# Patient Record
Sex: FEMALE | Race: WHITE | NOT HISPANIC OR LATINO
[De-identification: names, ages, dates, MRNs, and addresses within clinical notes are randomized per-mention and may not be internally consistent; named-entity substitution may affect disease eponyms.]

---

## 2017-01-31 ENCOUNTER — RECORD ABSTRACTING (OUTPATIENT)
Age: 62
End: 2017-01-31

## 2017-01-31 DIAGNOSIS — Z85.828 PERSONAL HISTORY OF OTHER MALIGNANT NEOPLASM OF SKIN: ICD-10-CM

## 2017-01-31 DIAGNOSIS — Z78.9 OTHER SPECIFIED HEALTH STATUS: ICD-10-CM

## 2017-01-31 DIAGNOSIS — L90.5 SCAR CONDITIONS AND FIBROSIS OF SKIN: ICD-10-CM

## 2017-01-31 DIAGNOSIS — Z87.898 PERSONAL HISTORY OF OTHER SPECIFIED CONDITIONS: ICD-10-CM

## 2017-01-31 RX ORDER — PRAVASTATIN SODIUM 80 MG/1
TABLET ORAL
Refills: 0 | Status: ACTIVE | COMMUNITY

## 2017-01-31 RX ORDER — NORTRIPTYLINE HCL 25 MG
CAPSULE ORAL
Refills: 0 | Status: ACTIVE | COMMUNITY

## 2017-02-08 ENCOUNTER — EMERGENCY (EMERGENCY)
Facility: HOSPITAL | Age: 62
LOS: 0 days | Discharge: HOME | End: 2017-02-08
Attending: EMERGENCY MEDICINE | Admitting: EMERGENCY MEDICINE
Payer: MEDICARE

## 2017-02-08 VITALS
HEART RATE: 94 BPM | OXYGEN SATURATION: 100 % | DIASTOLIC BLOOD PRESSURE: 81 MMHG | TEMPERATURE: 98 F | RESPIRATION RATE: 16 BRPM | SYSTOLIC BLOOD PRESSURE: 146 MMHG

## 2017-02-08 VITALS
TEMPERATURE: 98 F | SYSTOLIC BLOOD PRESSURE: 156 MMHG | RESPIRATION RATE: 16 BRPM | OXYGEN SATURATION: 99 % | DIASTOLIC BLOOD PRESSURE: 91 MMHG | HEART RATE: 119 BPM | WEIGHT: 164.91 LBS

## 2017-02-08 DIAGNOSIS — R07.9 CHEST PAIN, UNSPECIFIED: ICD-10-CM

## 2017-02-08 LAB
ALBUMIN SERPL ELPH-MCNC: 4.2 G/DL — SIGNIFICANT CHANGE UP (ref 3.3–5)
ALP SERPL-CCNC: 85 U/L — SIGNIFICANT CHANGE UP (ref 40–120)
ALT FLD-CCNC: 26 U/L — SIGNIFICANT CHANGE UP (ref 12–78)
ANION GAP SERPL CALC-SCNC: 6 MMOL/L — SIGNIFICANT CHANGE UP (ref 5–17)
AST SERPL-CCNC: 15 U/L — SIGNIFICANT CHANGE UP (ref 15–37)
BASOPHILS # BLD AUTO: 0.1 K/UL — SIGNIFICANT CHANGE UP (ref 0–0.2)
BASOPHILS NFR BLD AUTO: 0.9 % — SIGNIFICANT CHANGE UP (ref 0–2)
BILIRUB SERPL-MCNC: 0.5 MG/DL — SIGNIFICANT CHANGE UP (ref 0.2–1.2)
BUN SERPL-MCNC: 16 MG/DL — SIGNIFICANT CHANGE UP (ref 7–23)
CALCIUM SERPL-MCNC: 9 MG/DL — SIGNIFICANT CHANGE UP (ref 8.5–10.1)
CHLORIDE SERPL-SCNC: 109 MMOL/L — HIGH (ref 96–108)
CO2 SERPL-SCNC: 28 MMOL/L — SIGNIFICANT CHANGE UP (ref 22–31)
CREAT SERPL-MCNC: 0.65 MG/DL — SIGNIFICANT CHANGE UP (ref 0.5–1.3)
D DIMER BLD IA.RAPID-MCNC: <150 NG/ML DDU — SIGNIFICANT CHANGE UP
EOSINOPHIL # BLD AUTO: 0 K/UL — SIGNIFICANT CHANGE UP (ref 0–0.5)
EOSINOPHIL NFR BLD AUTO: 0.6 % — SIGNIFICANT CHANGE UP (ref 0–6)
GLUCOSE SERPL-MCNC: 110 MG/DL — HIGH (ref 70–99)
HCT VFR BLD CALC: 42.7 % — SIGNIFICANT CHANGE UP (ref 34.5–45)
HGB BLD-MCNC: 13.5 G/DL — SIGNIFICANT CHANGE UP (ref 11.5–15.5)
LYMPHOCYTES # BLD AUTO: 1.1 K/UL — SIGNIFICANT CHANGE UP (ref 1–3.3)
LYMPHOCYTES # BLD AUTO: 19 % — SIGNIFICANT CHANGE UP (ref 13–44)
MCHC RBC-ENTMCNC: 28.9 PG — SIGNIFICANT CHANGE UP (ref 27–34)
MCHC RBC-ENTMCNC: 31.7 GM/DL — LOW (ref 32–36)
MCV RBC AUTO: 91.4 FL — SIGNIFICANT CHANGE UP (ref 80–100)
MONOCYTES # BLD AUTO: 0.4 K/UL — SIGNIFICANT CHANGE UP (ref 0–0.9)
MONOCYTES NFR BLD AUTO: 6 % — SIGNIFICANT CHANGE UP (ref 2–14)
NEUTROPHILS # BLD AUTO: 4.4 K/UL — SIGNIFICANT CHANGE UP (ref 1.8–7.4)
NEUTROPHILS NFR BLD AUTO: 73.6 % — SIGNIFICANT CHANGE UP (ref 43–77)
PLATELET # BLD AUTO: 251 K/UL — SIGNIFICANT CHANGE UP (ref 150–400)
POTASSIUM SERPL-MCNC: 4.1 MMOL/L — SIGNIFICANT CHANGE UP (ref 3.5–5.3)
POTASSIUM SERPL-SCNC: 4.1 MMOL/L — SIGNIFICANT CHANGE UP (ref 3.5–5.3)
PROT SERPL-MCNC: 7.2 GM/DL — SIGNIFICANT CHANGE UP (ref 6–8.3)
RBC # BLD: 4.68 M/UL — SIGNIFICANT CHANGE UP (ref 3.8–5.2)
RBC # FLD: 12.5 % — SIGNIFICANT CHANGE UP (ref 10.3–14.5)
SODIUM SERPL-SCNC: 143 MMOL/L — SIGNIFICANT CHANGE UP (ref 135–145)
TROPONIN I SERPL-MCNC: <0.015 NG/ML — SIGNIFICANT CHANGE UP (ref 0.01–0.04)
TROPONIN I SERPL-MCNC: <0.015 NG/ML — SIGNIFICANT CHANGE UP (ref 0.01–0.04)
WBC # BLD: 6 K/UL — SIGNIFICANT CHANGE UP (ref 3.8–10.5)
WBC # FLD AUTO: 6 K/UL — SIGNIFICANT CHANGE UP (ref 3.8–10.5)

## 2017-02-08 PROCEDURE — 93010 ELECTROCARDIOGRAM REPORT: CPT

## 2017-02-08 PROCEDURE — 99285 EMERGENCY DEPT VISIT HI MDM: CPT

## 2017-02-08 PROCEDURE — 71020: CPT | Mod: 26

## 2017-02-08 RX ORDER — SODIUM CHLORIDE 9 MG/ML
1000 INJECTION INTRAMUSCULAR; INTRAVENOUS; SUBCUTANEOUS
Qty: 0 | Refills: 0 | Status: DISCONTINUED | OUTPATIENT
Start: 2017-02-08 | End: 2017-02-08

## 2017-02-08 RX ORDER — ASPIRIN/CALCIUM CARB/MAGNESIUM 324 MG
325 TABLET ORAL ONCE
Qty: 0 | Refills: 0 | Status: COMPLETED | OUTPATIENT
Start: 2017-02-08 | End: 2017-02-08

## 2017-02-08 RX ORDER — SODIUM CHLORIDE 9 MG/ML
3 INJECTION INTRAMUSCULAR; INTRAVENOUS; SUBCUTANEOUS ONCE
Qty: 0 | Refills: 0 | Status: DISCONTINUED | OUTPATIENT
Start: 2017-02-08 | End: 2017-02-08

## 2017-02-08 RX ADMIN — Medication 325 MILLIGRAM(S): at 07:28

## 2017-02-08 NOTE — ED PROVIDER NOTE - MUSCULOSKELETAL, MLM
Spine appears normal, range of motion is not limited. Focal tenderness to costochondral margin, left side ribs #8-9.

## 2017-02-08 NOTE — ED ADULT TRIAGE NOTE - CHIEF COMPLAINT QUOTE
Chest tightness intermittently xseveral days. Saw PMD yesterday for possible shingles to left side and was told it wasn't shingles and was given naproxen.

## 2017-02-08 NOTE — ED PROVIDER NOTE - OBJECTIVE STATEMENT
61f PMH HTN, GERD, presents for intermittent chest discomfort. Pt had some pain relief when she undid her bra. Pain is positional and when her hands are over her head, it doesn't hurt as much. Radiates to upper back. She went to her PMD for an unrelated burning sensation in the left side yesterday. Pain does not feel like reflux. Denies cardiac FHx.

## 2017-03-08 ENCOUNTER — APPOINTMENT (OUTPATIENT)
Dept: DERMATOLOGY | Facility: CLINIC | Age: 62
End: 2017-03-08

## 2017-04-24 ENCOUNTER — APPOINTMENT (OUTPATIENT)
Dept: DERMATOLOGY | Facility: CLINIC | Age: 62
End: 2017-04-24

## 2017-04-24 VITALS — WEIGHT: 160 LBS | BODY MASS INDEX: 25.11 KG/M2 | HEIGHT: 67 IN

## 2017-10-23 ENCOUNTER — APPOINTMENT (OUTPATIENT)
Dept: DERMATOLOGY | Facility: CLINIC | Age: 62
End: 2017-10-23
Payer: MEDICARE

## 2017-10-23 DIAGNOSIS — D23.9 OTHER BENIGN NEOPLASM OF SKIN, UNSPECIFIED: ICD-10-CM

## 2017-10-23 DIAGNOSIS — L82.0 INFLAMED SEBORRHEIC KERATOSIS: ICD-10-CM

## 2017-10-23 PROCEDURE — 17000 DESTRUCT PREMALG LESION: CPT | Mod: 59

## 2017-10-23 PROCEDURE — 17110 DESTRUCTION B9 LES UP TO 14: CPT

## 2017-10-23 PROCEDURE — 99213 OFFICE O/P EST LOW 20 MIN: CPT | Mod: 25

## 2017-10-23 PROCEDURE — 17003 DESTRUCT PREMALG LES 2-14: CPT

## 2017-10-23 PROCEDURE — 0039D: CPT

## 2017-10-23 RX ORDER — RANITIDINE HYDROCHLORIDE 300 MG/1
TABLET, FILM COATED ORAL
Refills: 0 | Status: DISCONTINUED | COMMUNITY
End: 2017-10-23

## 2017-10-23 RX ORDER — CLONAZEPAM 0.5 MG/1
0.5 TABLET ORAL
Qty: 60 | Refills: 0 | Status: ACTIVE | COMMUNITY
Start: 2017-08-24

## 2017-10-23 RX ORDER — PREDNISONE 10 MG/1
10 TABLET ORAL
Refills: 0 | Status: DISCONTINUED | COMMUNITY
End: 2017-10-23

## 2017-10-23 RX ORDER — OMEPRAZOLE 20 MG/1
TABLET, DELAYED RELEASE ORAL
Refills: 0 | Status: DISCONTINUED | COMMUNITY
End: 2017-10-23

## 2018-07-28 PROBLEM — Z85.828 HISTORY OF BASAL CELL CARCINOMA: Status: RESOLVED | Noted: 2017-01-31 | Resolved: 2018-07-28

## 2018-09-02 ENCOUNTER — EMERGENCY (EMERGENCY)
Facility: HOSPITAL | Age: 63
LOS: 0 days | Discharge: HOME | End: 2018-09-02
Attending: EMERGENCY MEDICINE | Admitting: EMERGENCY MEDICINE
Payer: MEDICARE

## 2018-09-02 VITALS
HEART RATE: 113 BPM | RESPIRATION RATE: 17 BRPM | WEIGHT: 154.98 LBS | HEIGHT: 65 IN | DIASTOLIC BLOOD PRESSURE: 77 MMHG | SYSTOLIC BLOOD PRESSURE: 145 MMHG | TEMPERATURE: 98 F | OXYGEN SATURATION: 98 %

## 2018-09-02 VITALS
DIASTOLIC BLOOD PRESSURE: 83 MMHG | TEMPERATURE: 98 F | HEART RATE: 90 BPM | SYSTOLIC BLOOD PRESSURE: 144 MMHG | OXYGEN SATURATION: 100 % | RESPIRATION RATE: 17 BRPM

## 2018-09-02 DIAGNOSIS — I10 ESSENTIAL (PRIMARY) HYPERTENSION: ICD-10-CM

## 2018-09-02 DIAGNOSIS — S80.01XA CONTUSION OF RIGHT KNEE, INITIAL ENCOUNTER: ICD-10-CM
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
## 2018-09-02 DIAGNOSIS — S50.01XA CONTUSION OF RIGHT ELBOW, INITIAL ENCOUNTER: ICD-10-CM

## 2018-09-02 PROCEDURE — 73562 X-RAY EXAM OF KNEE 3: CPT | Mod: 26,RT

## 2018-09-02 PROCEDURE — 73080 X-RAY EXAM OF ELBOW: CPT | Mod: 26,RT

## 2018-09-02 PROCEDURE — 73502 X-RAY EXAM HIP UNI 2-3 VIEWS: CPT | Mod: 26,RT

## 2018-09-02 PROCEDURE — 99284 EMERGENCY DEPT VISIT MOD MDM: CPT

## 2018-09-02 PROCEDURE — 73060 X-RAY EXAM OF HUMERUS: CPT | Mod: 26,RT

## 2018-09-02 PROCEDURE — 71046 X-RAY EXAM CHEST 2 VIEWS: CPT | Mod: 26

## 2018-09-02 PROCEDURE — 73030 X-RAY EXAM OF SHOULDER: CPT | Mod: 26,RT

## 2018-09-02 NOTE — ED ADULT NURSE NOTE - NSIMPLEMENTINTERV_GEN_ALL_ED
Implemented All Fall Risk Interventions:  Ball to call system. Call bell, personal items and telephone within reach. Instruct patient to call for assistance. Room bathroom lighting operational. Non-slip footwear when patient is off stretcher. Physically safe environment: no spills, clutter or unnecessary equipment. Stretcher in lowest position, wheels locked, appropriate side rails in place. Provide visual cue, wrist band, yellow gown, etc. Monitor gait and stability. Monitor for mental status changes and reorient to person, place, and time. Review medications for side effects contributing to fall risk. Reinforce activity limits and safety measures with patient and family.

## 2018-09-02 NOTE — ED PROVIDER NOTE - PHYSICAL EXAMINATION
***GEN - NAD; well appearing; A+O x3 ***HEAD - NC/AT   ***PULMONARY - CTA b/l, symmetric breath sounds. ***CARDIAC -s1s2, RRR, no M,G,R  ***ABDOMEN - ND, NT, soft, no guarding, no rebound, no masses    ***SKIN - no rash or bruising   ***NEUROLOGIC - alert and oriented, follows commands, sensation nl, motor nl, ***PSYCH - insight and judgment nl, memory nl, affect nl, thought nl   Bruising to R knee, R elbow, and distal humerus  FROM to all joints, minimally tender  neurovascular intact  No midline TTP

## 2018-09-02 NOTE — ED PROVIDER NOTE - OBJECTIVE STATEMENT
63 y/o F w/ hx depression, HTN pf fall.  pt tripped over dog gate @ 3AM landing on R knee/arm.  Notes bruising and pain improved w/ advil this AM.  Denies CP, SOB, AP, n/v, numbness/weakness.  No AC use.

## 2018-09-02 NOTE — ED ADULT TRIAGE NOTE - CHIEF COMPLAINT QUOTE
tripped over baby gate and has bruising on her right arm, hip and knee. no head trauma no anticoagulation

## 2018-11-08 NOTE — ED ADULT NURSE NOTE - NSSISCREENINGQ5_ED_A_ED
LMOM for pt to C/B, C/B # provided  --when pt calls need to tell her, her last dose of ASA is on 11/29/18 and to start holding it on 11/30/18 for her procedure scheduled on 12/6  No

## 2018-11-26 ENCOUNTER — APPOINTMENT (OUTPATIENT)
Dept: DERMATOLOGY | Facility: CLINIC | Age: 63
End: 2018-11-26
Payer: MEDICARE

## 2018-11-26 VITALS — BODY MASS INDEX: 26.16 KG/M2 | HEIGHT: 65 IN | WEIGHT: 157 LBS

## 2018-11-26 PROBLEM — F32.9 MAJOR DEPRESSIVE DISORDER, SINGLE EPISODE, UNSPECIFIED: Chronic | Status: ACTIVE | Noted: 2018-09-02

## 2018-11-26 PROCEDURE — 99213 OFFICE O/P EST LOW 20 MIN: CPT

## 2018-11-26 RX ORDER — HYDROCORTISONE 25 MG/ML
2.5 LOTION TOPICAL TWICE DAILY
Qty: 1 | Refills: 1 | Status: DISCONTINUED | COMMUNITY
Start: 2017-10-23 | End: 2018-11-26

## 2018-11-26 RX ORDER — CETIRIZINE HYDROCHLORIDE 10 MG/1
10 TABLET, FILM COATED ORAL
Refills: 0 | Status: DISCONTINUED | COMMUNITY
End: 2018-11-26

## 2019-06-29 ENCOUNTER — TRANSCRIPTION ENCOUNTER (OUTPATIENT)
Age: 64
End: 2019-06-29

## 2019-12-02 ENCOUNTER — APPOINTMENT (OUTPATIENT)
Dept: DERMATOLOGY | Facility: CLINIC | Age: 64
End: 2019-12-02
Payer: MEDICARE

## 2019-12-02 VITALS — HEIGHT: 65 IN | WEIGHT: 157 LBS | BODY MASS INDEX: 26.16 KG/M2

## 2019-12-02 DIAGNOSIS — L82.1 OTHER SEBORRHEIC KERATOSIS: ICD-10-CM

## 2019-12-02 DIAGNOSIS — H91.93 UNSPECIFIED HEARING LOSS, BILATERAL: ICD-10-CM

## 2019-12-02 DIAGNOSIS — Z00.00 ENCOUNTER FOR GENERAL ADULT MEDICAL EXAMINATION W/OUT ABNORMAL FINDINGS: ICD-10-CM

## 2019-12-02 PROCEDURE — 99213 OFFICE O/P EST LOW 20 MIN: CPT

## 2019-12-02 NOTE — HISTORY OF PRESENT ILLNESS
[FreeTextEntry1] : Patient presents for skin examination. [de-identified] : Denies new, changing, bleeding or tender lesions on the skin over the past year.\par

## 2019-12-02 NOTE — PHYSICAL EXAM
[Alert] : alert [Oriented x 3] : ~L oriented x 3 [Well Nourished] : well nourished [Full Body Skin Exam Performed] : performed [FreeTextEntry3] : A full skin exam was performed including the scalp, face, neck, chest, abdomen, back, buttocks, upper extremities and lower extremities.  The patient declined examination of the breasts and genitalia.  \par The exam did not reveal any evidence of skin cancer, showing only the following benign growths:\par [place] pigmented nevi.\par Seborrheic keratoses - right abdomen.\par Lentigines.\par Cherry angioma.\par \par

## 2019-12-02 NOTE — ASSESSMENT
[FreeTextEntry1] : A complete skin examination was performed.  There is no evidence of skin cancer.  We discussed the importance of photoprotection, including the use of hats, protective clothing and sunscreens with an SPF of at least 30.  Sun avoidance was also discussed.\par \par Patient to see Dr. Montejo to consider if she is a candidate for Ulthera.

## 2020-01-14 ENCOUNTER — APPOINTMENT (OUTPATIENT)
Dept: DERMATOLOGY | Facility: CLINIC | Age: 65
End: 2020-01-14
Payer: MEDICARE

## 2020-01-14 DIAGNOSIS — L57.8 OTHER SKIN CHANGES DUE TO CHRONIC EXPOSURE TO NONIONIZING RADIATION: ICD-10-CM

## 2020-01-14 DIAGNOSIS — E88.1 LIPODYSTROPHY, NOT ELSEWHERE CLASSIFIED: ICD-10-CM

## 2020-01-14 PROCEDURE — 99213 OFFICE O/P EST LOW 20 MIN: CPT

## 2020-12-03 ENCOUNTER — APPOINTMENT (OUTPATIENT)
Dept: DERMATOLOGY | Facility: CLINIC | Age: 65
End: 2020-12-03
Payer: MEDICARE

## 2020-12-03 VITALS — WEIGHT: 155 LBS | BODY MASS INDEX: 25.83 KG/M2 | HEIGHT: 65 IN

## 2020-12-03 DIAGNOSIS — L57.0 ACTINIC KERATOSIS: ICD-10-CM

## 2020-12-03 DIAGNOSIS — D22.9 MELANOCYTIC NEVI, UNSPECIFIED: ICD-10-CM

## 2020-12-03 PROCEDURE — 17000 DESTRUCT PREMALG LESION: CPT

## 2020-12-03 PROCEDURE — 99213 OFFICE O/P EST LOW 20 MIN: CPT | Mod: 25

## 2020-12-03 RX ORDER — HYDROXYZINE HYDROCHLORIDE 25 MG/1
25 TABLET ORAL
Refills: 0 | Status: DISCONTINUED | COMMUNITY
End: 2020-12-03

## 2020-12-03 RX ORDER — CHOLECALCIFEROL (VITAMIN D3) 25 MCG
TABLET ORAL
Refills: 0 | Status: ACTIVE | COMMUNITY

## 2020-12-03 NOTE — HISTORY OF PRESENT ILLNESS
[FreeTextEntry1] : Patient presents for skin examination. [de-identified] : Notes lesion of the nose.  Present for months.  No bleeding or tenderness.  No self tx.

## 2020-12-03 NOTE — PHYSICAL EXAM
[Alert] : alert [Oriented x 3] : ~L oriented x 3 [Well Nourished] : well nourished [Full Body Skin Exam Performed] : performed [FreeTextEntry3] : A full skin exam was performed including the scalp, face, neck, chest, abdomen, back, buttocks, upper extremities and lower extremities.  The patient declined examination of the breasts and genitalia.  \par The exam did show the following benign growths:\par [place] pigmented nevi.\par Lentigines.\par Cherry angioma.\par \par Keratotic erythematous papule of the right nasal tip.\par \par

## 2021-03-31 ENCOUNTER — TRANSCRIPTION ENCOUNTER (OUTPATIENT)
Age: 66
End: 2021-03-31

## 2021-03-31 ENCOUNTER — INPATIENT (INPATIENT)
Facility: HOSPITAL | Age: 66
LOS: 0 days | Discharge: HOME | DRG: 69 | End: 2021-04-01
Attending: HOSPITALIST | Admitting: INTERNAL MEDICINE
Payer: MEDICARE

## 2021-03-31 VITALS — WEIGHT: 161.16 LBS | HEIGHT: 65 IN

## 2021-03-31 DIAGNOSIS — G45.9 TRANSIENT CEREBRAL ISCHEMIC ATTACK, UNSPECIFIED: ICD-10-CM

## 2021-03-31 LAB
ALBUMIN SERPL ELPH-MCNC: 4.3 G/DL — SIGNIFICANT CHANGE UP (ref 3.3–5)
ALP SERPL-CCNC: 84 U/L — SIGNIFICANT CHANGE UP (ref 40–120)
ALT FLD-CCNC: 19 U/L — SIGNIFICANT CHANGE UP (ref 12–78)
ANION GAP SERPL CALC-SCNC: 5 MMOL/L — SIGNIFICANT CHANGE UP (ref 5–17)
APPEARANCE UR: CLEAR — SIGNIFICANT CHANGE UP
APTT BLD: 32.7 SEC — SIGNIFICANT CHANGE UP (ref 27.5–35.5)
AST SERPL-CCNC: 13 U/L — LOW (ref 15–37)
BASOPHILS # BLD AUTO: 0.05 K/UL — SIGNIFICANT CHANGE UP (ref 0–0.2)
BASOPHILS NFR BLD AUTO: 0.8 % — SIGNIFICANT CHANGE UP (ref 0–2)
BILIRUB SERPL-MCNC: 0.8 MG/DL — SIGNIFICANT CHANGE UP (ref 0.2–1.2)
BILIRUB UR-MCNC: NEGATIVE — SIGNIFICANT CHANGE UP
BUN SERPL-MCNC: 15 MG/DL — SIGNIFICANT CHANGE UP (ref 7–23)
CALCIUM SERPL-MCNC: 9.5 MG/DL — SIGNIFICANT CHANGE UP (ref 8.5–10.1)
CHLORIDE SERPL-SCNC: 106 MMOL/L — SIGNIFICANT CHANGE UP (ref 96–108)
CO2 SERPL-SCNC: 28 MMOL/L — SIGNIFICANT CHANGE UP (ref 22–31)
COLOR SPEC: YELLOW — SIGNIFICANT CHANGE UP
CREAT SERPL-MCNC: 0.84 MG/DL — SIGNIFICANT CHANGE UP (ref 0.5–1.3)
DIFF PNL FLD: NEGATIVE — SIGNIFICANT CHANGE UP
EOSINOPHIL # BLD AUTO: 0.01 K/UL — SIGNIFICANT CHANGE UP (ref 0–0.5)
EOSINOPHIL NFR BLD AUTO: 0.2 % — SIGNIFICANT CHANGE UP (ref 0–6)
GLUCOSE SERPL-MCNC: 104 MG/DL — HIGH (ref 70–99)
GLUCOSE UR QL: NEGATIVE MG/DL — SIGNIFICANT CHANGE UP
HCT VFR BLD CALC: 43.4 % — SIGNIFICANT CHANGE UP (ref 34.5–45)
HGB BLD-MCNC: 14.1 G/DL — SIGNIFICANT CHANGE UP (ref 11.5–15.5)
IMM GRANULOCYTES NFR BLD AUTO: 0.3 % — SIGNIFICANT CHANGE UP (ref 0–1.5)
INR BLD: 1.11 RATIO — SIGNIFICANT CHANGE UP (ref 0.88–1.16)
KETONES UR-MCNC: NEGATIVE — SIGNIFICANT CHANGE UP
LEUKOCYTE ESTERASE UR-ACNC: NEGATIVE — SIGNIFICANT CHANGE UP
LYMPHOCYTES # BLD AUTO: 1.45 K/UL — SIGNIFICANT CHANGE UP (ref 1–3.3)
LYMPHOCYTES # BLD AUTO: 22.9 % — SIGNIFICANT CHANGE UP (ref 13–44)
MCHC RBC-ENTMCNC: 29.9 PG — SIGNIFICANT CHANGE UP (ref 27–34)
MCHC RBC-ENTMCNC: 32.5 GM/DL — SIGNIFICANT CHANGE UP (ref 32–36)
MCV RBC AUTO: 91.9 FL — SIGNIFICANT CHANGE UP (ref 80–100)
MONOCYTES # BLD AUTO: 0.57 K/UL — SIGNIFICANT CHANGE UP (ref 0–0.9)
MONOCYTES NFR BLD AUTO: 9 % — SIGNIFICANT CHANGE UP (ref 2–14)
NEUTROPHILS # BLD AUTO: 4.22 K/UL — SIGNIFICANT CHANGE UP (ref 1.8–7.4)
NEUTROPHILS NFR BLD AUTO: 66.8 % — SIGNIFICANT CHANGE UP (ref 43–77)
NITRITE UR-MCNC: NEGATIVE — SIGNIFICANT CHANGE UP
PH UR: 8 — SIGNIFICANT CHANGE UP (ref 5–8)
PLATELET # BLD AUTO: 275 K/UL — SIGNIFICANT CHANGE UP (ref 150–400)
POTASSIUM SERPL-MCNC: 3.8 MMOL/L — SIGNIFICANT CHANGE UP (ref 3.5–5.3)
POTASSIUM SERPL-SCNC: 3.8 MMOL/L — SIGNIFICANT CHANGE UP (ref 3.5–5.3)
PROT SERPL-MCNC: 7.9 GM/DL — SIGNIFICANT CHANGE UP (ref 6–8.3)
PROT UR-MCNC: NEGATIVE MG/DL — SIGNIFICANT CHANGE UP
PROTHROM AB SERPL-ACNC: 12.9 SEC — SIGNIFICANT CHANGE UP (ref 10.6–13.6)
RBC # BLD: 4.72 M/UL — SIGNIFICANT CHANGE UP (ref 3.8–5.2)
RBC # FLD: 13.4 % — SIGNIFICANT CHANGE UP (ref 10.3–14.5)
SODIUM SERPL-SCNC: 139 MMOL/L — SIGNIFICANT CHANGE UP (ref 135–145)
SP GR SPEC: 1.02 — SIGNIFICANT CHANGE UP (ref 1.01–1.02)
TROPONIN I SERPL-MCNC: <0.015 NG/ML — SIGNIFICANT CHANGE UP (ref 0.01–0.04)
UROBILINOGEN FLD QL: NEGATIVE MG/DL — SIGNIFICANT CHANGE UP
WBC # BLD: 6.32 K/UL — SIGNIFICANT CHANGE UP (ref 3.8–10.5)
WBC # FLD AUTO: 6.32 K/UL — SIGNIFICANT CHANGE UP (ref 3.8–10.5)

## 2021-03-31 PROCEDURE — 99285 EMERGENCY DEPT VISIT HI MDM: CPT | Mod: CS

## 2021-03-31 PROCEDURE — 83036 HEMOGLOBIN GLYCOSYLATED A1C: CPT

## 2021-03-31 PROCEDURE — 70551 MRI BRAIN STEM W/O DYE: CPT

## 2021-03-31 PROCEDURE — 86769 SARS-COV-2 COVID-19 ANTIBODY: CPT

## 2021-03-31 PROCEDURE — 70496 CT ANGIOGRAPHY HEAD: CPT | Mod: 26

## 2021-03-31 PROCEDURE — 99222 1ST HOSP IP/OBS MODERATE 55: CPT | Mod: AI

## 2021-03-31 PROCEDURE — 84443 ASSAY THYROID STIM HORMONE: CPT

## 2021-03-31 PROCEDURE — 86780 TREPONEMA PALLIDUM: CPT

## 2021-03-31 PROCEDURE — 70498 CT ANGIOGRAPHY NECK: CPT | Mod: 26

## 2021-03-31 PROCEDURE — 0042T: CPT

## 2021-03-31 PROCEDURE — 93010 ELECTROCARDIOGRAM REPORT: CPT

## 2021-03-31 PROCEDURE — 80061 LIPID PANEL: CPT

## 2021-03-31 PROCEDURE — 71045 X-RAY EXAM CHEST 1 VIEW: CPT | Mod: 26

## 2021-03-31 PROCEDURE — 86803 HEPATITIS C AB TEST: CPT

## 2021-03-31 PROCEDURE — 70450 CT HEAD/BRAIN W/O DYE: CPT | Mod: 26,59

## 2021-03-31 PROCEDURE — 36415 COLL VENOUS BLD VENIPUNCTURE: CPT

## 2021-03-31 PROCEDURE — 93306 TTE W/DOPPLER COMPLETE: CPT

## 2021-03-31 RX ORDER — ACETAMINOPHEN 500 MG
650 TABLET ORAL EVERY 6 HOURS
Refills: 0 | Status: DISCONTINUED | OUTPATIENT
Start: 2021-03-31 | End: 2021-04-01

## 2021-03-31 RX ORDER — ATORVASTATIN CALCIUM 80 MG/1
10 TABLET, FILM COATED ORAL AT BEDTIME
Refills: 0 | Status: DISCONTINUED | OUTPATIENT
Start: 2021-03-31 | End: 2021-04-01

## 2021-03-31 RX ORDER — ONDANSETRON 8 MG/1
4 TABLET, FILM COATED ORAL EVERY 6 HOURS
Refills: 0 | Status: DISCONTINUED | OUTPATIENT
Start: 2021-03-31 | End: 2021-04-01

## 2021-03-31 RX ORDER — CLONAZEPAM 1 MG
1 TABLET ORAL
Qty: 0 | Refills: 0 | DISCHARGE

## 2021-03-31 RX ORDER — ASPIRIN/CALCIUM CARB/MAGNESIUM 324 MG
325 TABLET ORAL ONCE
Refills: 0 | Status: COMPLETED | OUTPATIENT
Start: 2021-03-31 | End: 2021-03-31

## 2021-03-31 RX ORDER — ASPIRIN/CALCIUM CARB/MAGNESIUM 324 MG
81 TABLET ORAL DAILY
Refills: 0 | Status: DISCONTINUED | OUTPATIENT
Start: 2021-03-31 | End: 2021-04-01

## 2021-03-31 RX ORDER — NORTRIPTYLINE HYDROCHLORIDE 10 MG/1
1 CAPSULE ORAL
Qty: 0 | Refills: 0 | DISCHARGE

## 2021-03-31 RX ORDER — PREGABALIN 225 MG/1
1 CAPSULE ORAL
Qty: 0 | Refills: 0 | DISCHARGE

## 2021-03-31 RX ORDER — NORTRIPTYLINE HYDROCHLORIDE 10 MG/1
25 CAPSULE ORAL AT BEDTIME
Refills: 0 | Status: DISCONTINUED | OUTPATIENT
Start: 2021-03-31 | End: 2021-04-01

## 2021-03-31 RX ORDER — OMEPRAZOLE 10 MG/1
1 CAPSULE, DELAYED RELEASE ORAL
Qty: 0 | Refills: 0 | DISCHARGE

## 2021-03-31 RX ORDER — CHOLECALCIFEROL (VITAMIN D3) 125 MCG
1 CAPSULE ORAL
Qty: 0 | Refills: 0 | DISCHARGE

## 2021-03-31 RX ORDER — CLONAZEPAM 1 MG
0 TABLET ORAL
Qty: 0 | Refills: 0 | DISCHARGE

## 2021-03-31 RX ORDER — CHOLECALCIFEROL (VITAMIN D3) 125 MCG
0 CAPSULE ORAL
Qty: 0 | Refills: 0 | DISCHARGE

## 2021-03-31 RX ORDER — ENOXAPARIN SODIUM 100 MG/ML
40 INJECTION SUBCUTANEOUS DAILY
Refills: 0 | Status: DISCONTINUED | OUTPATIENT
Start: 2021-03-31 | End: 2021-04-01

## 2021-03-31 RX ORDER — CLONAZEPAM 1 MG
0.5 TABLET ORAL
Refills: 0 | Status: DISCONTINUED | OUTPATIENT
Start: 2021-03-31 | End: 2021-04-01

## 2021-03-31 RX ADMIN — Medication 325 MILLIGRAM(S): at 13:07

## 2021-03-31 RX ADMIN — ATORVASTATIN CALCIUM 10 MILLIGRAM(S): 80 TABLET, FILM COATED ORAL at 21:36

## 2021-03-31 RX ADMIN — Medication 0.5 MILLIGRAM(S): at 17:02

## 2021-03-31 RX ADMIN — NORTRIPTYLINE HYDROCHLORIDE 25 MILLIGRAM(S): 10 CAPSULE ORAL at 21:36

## 2021-03-31 NOTE — H&P ADULT - NSHPPHYSICALEXAM_GEN_ALL_CORE
PHYSICAL EXAM:    Daily Height in cm: 165.1 (31 Mar 2021 11:36)    Daily     ICU Vital Signs Last 24 Hrs  T(C): 36.8 (31 Mar 2021 11:59), Max: 36.8 (31 Mar 2021 11:59)  T(F): 98.3 (31 Mar 2021 11:59), Max: 98.3 (31 Mar 2021 11:59)  HR: 106 (31 Mar 2021 11:59) (106 - 106)  BP: 136/82 (31 Mar 2021 11:59) (136/82 - 136/82)  BP(mean): --  ABP: --  ABP(mean): --  RR: 15 (31 Mar 2021 11:59) (15 - 15)  SpO2: 100% (31 Mar 2021 11:59) (100% - 100%)      Constitutional: Well appearing  HEENT: Atraumatic, MARA, Normal, No congestion  Respiratory: Breath Sounds normal, no rhonchi/wheeze  Cardiovascular: N S1S2;   Gastrointestinal: Abdomen soft, non tender, Bowel Sounds present  Extremities: No edema, peripheral pulses present  Neurological: AAO x 3, no gross focal motor deficits  Skin: Non cellulitic, no rash, ulcers  Lymph Nodes: No lymphadenopathy noted  Back: No CVA tenderness   Musculoskeletal: non tender  Breasts: Deferred  Genitourinary: deferred  Rectal: Deferred

## 2021-03-31 NOTE — H&P ADULT - NSICDXFAMILYHX_GEN_ALL_CORE_FT
FAMILY HISTORY:  Father  Still living? No  Family history of CVA, Age at diagnosis: Age Unknown    Mother  Still living? No  FH: HTN (hypertension), Age at diagnosis: Age Unknown

## 2021-03-31 NOTE — ED PROVIDER NOTE - PROGRESS NOTE DETAILS
austyn: Discussed need to admit with patient & discussed risk and benefits.  Patient agreed to admission.  Discussed case w/ admitting doctor - agreed to admit to their service. will place bridge orders. Accepting physician said:   DO NOT MOVE prior to inpatient team evaluation

## 2021-03-31 NOTE — ED PROVIDER NOTE - OBJECTIVE STATEMENT
Pertinent HPI/PMH/PSH/FHx/SHx and Review of Systems contained within  HPI: 64 y/o Patient p/w CC intermittent difficulty speaking x 7am, new onset. States 2 days ago she couldn't remember names of people at a party. Then this morning states she had difficulty finding her words and slurred speech  PMH/PSH relevant for: Depression, HTN  ROS negative for: fever, Chest pain, SOB, Nausea, vomiting, diarrhea, abdominal pain, dysuria    FamilyHx and SocialHx not otherwise contributory Pertinent HPI/PMH/PSH/FHx/SHx and Review of Systems contained within  HPI: 64 y/o Patient p/w CC intermittent difficulty speaking x 7am, new onset. States 2 days ago she couldn't remember names of people at a party. Then this morning states she had difficulty finding her words and slurred speech which has been intermittent. No hx of similar sx before.   PMH/PSH relevant for: Depression, HTN  ROS negative for: fever, Chest pain, SOB, Nausea, vomiting, diarrhea, abdominal pain, dysuria    FamilyHx and SocialHx not otherwise contributory

## 2021-03-31 NOTE — ED PROVIDER NOTE - PHYSICAL EXAMINATION
Discussed with endo- decrease medrol dose in the morning.  Watch sugars for few mre hrs and decide on disposition
*GEN: Pt is tearful on exam, well appearing   *HEAD: Normocephalic, Atraumatic  *EYES/NOSE: b/l Pupils symmetric & Reactive to ligth, EOMI b/l  *THROAT: airway patent, moist mucous membranes  *NECK: Neck supple  *PULMONARY: No Respiratory distress, symmetric b/l chest rise  *CARDIAC: s1s2, regular rhythm   *ABDOMEN:  Non Tender, Non Distended, soft, no guarding, no rebound, no masses   *BACK: no CVA tenderness, No midline vertebral tenderness to palpation   *EXTREMITIES: symmetric pulses, 2+ DP & radial pulses, no cyanosis, no edema   *SKIN: no rash, no bruising   *NEUROLOGIC: alert, CN 2-12 intact, normal finger to nose & heel to shin; no dysdiadochokinesis; equal & normal strength & sensation in b/l UE/LE; full active & passive ROM in all extremities,  no pronator drift, normal patellar reflex, normal gait, romberg sign negative, NIH- 0  *PSYCH: appropriate concern about symptoms, pleasant

## 2021-03-31 NOTE — H&P ADULT - ASSESSMENT
65/F with PMHx of HTN, depression, brought to ED today for c/o slurred speech with difficulty speaking which started today morning. She had difficulty finding her words and slurred speech which has been intermittent. No hx of similar sx before. Denies any known Hx of CVA, TIA, CAD, PVD. She also states that 2 days ago she couldn't remember the names of people at a party. Speech is better at this time. But not at baseline.     CT head, CTA head/neck neg for acute CVA.     Pt admitted with     1) TIA r/o CVA:  observe on tele  start as 81 mg daily  check lipid profile  check TSH, RPR  neuro checks  CT head, CTA head and neck neg  pt is severely claustrophobic; would need sedation if MRI is required by neuro  neuro consult  cardio consult  echo    2) Depression/anxiety: cont home meds    3) DVT PPX: lovenox    poc discussed with pt, team.

## 2021-03-31 NOTE — H&P ADULT - HISTORY OF PRESENT ILLNESS
65/F with PMHx of HTN, depression, brought to ED today for c/o slurred speech with difficulty speaking which started today morning. She had difficulty finding her words and slurred speech which has been intermittent. No hx of similar sx before. Denies any known Hx of CVA, TIA, CAD, PVD. She also states that 2 days ago she couldn't remember the names of people at a party. Speech is better at this time. But not at baseline.     CT head, CTA head/neck neg for acute CVA.

## 2021-03-31 NOTE — ED PROVIDER NOTE - CLINICAL SUMMARY MEDICAL DECISION MAKING FREE TEXT BOX
word finding difficulty since 7am, NIH 0, not a tpa candidate, will CT, dispo per neurology word finding difficulty since 7am, NIH 0, not a tpa candidate, will CT, concerns for tia, will admit

## 2021-03-31 NOTE — ED ADULT TRIAGE NOTE - CHIEF COMPLAINT QUOTE
Pt reports that she started to have slurred speech and difficulty finding words this AM.  Pt reports that she did have symptoms of difficulty recognizing close associates two days ago, but reports returning to normal yesterday.  Dr. Vallejo to pivot and called code stroke.

## 2021-03-31 NOTE — ED PROVIDER NOTE - NS ED ROS FT
Review of Systems:  	•	CONSTITUTIONAL: no fever  	•	SKIN: no rash  	•	RESPIRATORY: no shortness of breath  	•	CARDIAC: no chest pain  	•	GI:  no abd pain, no nausea, no vomiting, no diarrhea  	•	GENITO-URINARY:  no dysuria  	•	MUSCULOSKELETAL:  no back pain  	•	NEUROLOGIC: no weakness, +difficulty speaking   	•	ALLERGY: no rhinorrhea  	•	PSYSCHIATRIC: appropriate concern about symptoms

## 2021-03-31 NOTE — H&P ADULT - NSHPLABSRESULTS_GEN_ALL_CORE
Lab Results:  CBC  CBC Full  -  ( 31 Mar 2021 11:45 )  WBC Count : 6.32 K/uL  RBC Count : 4.72 M/uL  Hemoglobin : 14.1 g/dL  Hematocrit : 43.4 %  Platelet Count - Automated : 275 K/uL  Mean Cell Volume : 91.9 fl  Mean Cell Hemoglobin : 29.9 pg  Mean Cell Hemoglobin Concentration : 32.5 gm/dL  Auto Neutrophil # : 4.22 K/uL  Auto Lymphocyte # : 1.45 K/uL  Auto Monocyte # : 0.57 K/uL  Auto Eosinophil # : 0.01 K/uL  Auto Basophil # : 0.05 K/uL  Auto Neutrophil % : 66.8 %  Auto Lymphocyte % : 22.9 %  Auto Monocyte % : 9.0 %  Auto Eosinophil % : 0.2 %  Auto Basophil % : 0.8 %    .		Differential:	[] Automated		[] Manual  Chemistry                        14.1   6.32  )-----------( 275      ( 31 Mar 2021 11:45 )             43.4     03-31    139  |  106  |  15  ----------------------------<  104<H>  3.8   |  28  |  0.84    Ca    9.5      31 Mar 2021 11:45    TPro  7.9  /  Alb  4.3  /  TBili  0.8  /  DBili  x   /  AST  13<L>  /  ALT  19  /  AlkPhos  84  0331    LIVER FUNCTIONS - ( 31 Mar 2021 11:45 )  Alb: 4.3 g/dL / Pro: 7.9 gm/dL / ALK PHOS: 84 U/L / ALT: 19 U/L / AST: 13 U/L / GGT: x           PT/INR - ( 31 Mar 2021 11:45 )   PT: 12.9 sec;   INR: 1.11 ratio         PTT - ( 31 Mar 2021 11:45 )  PTT:32.7 sec  Urinalysis Basic - ( 31 Mar 2021 13:10 )    Color: Yellow / Appearance: Clear / S.020 / pH: x  Gluc: x / Ketone: Negative  / Bili: Negative / Urobili: Negative mg/dL   Blood: x / Protein: Negative mg/dL / Nitrite: Negative   Leuk Esterase: Negative / RBC: x / WBC x   Sq Epi: x / Non Sq Epi: x / Bacteria: x      RADIOLOGY RESULTS:    r< from: Xray Chest 1 View- PORTABLE-Urgent (Xray Chest 1 View- PORTABLE-Urgent .) (21 @ 12:17) >    IMPRESSION: No acute finding or change.    < end of copied text >    < from: CT Angio Neck w/ IV Cont (21 @ 11:52) >    IMPRESSION:      CT PERFUSION demonstrated: No asymmetric or territorial perfusion abnormality.    If symptoms persist consider follow up head CT or MRI, MRA  if no contraindication.    CTA COW:  Patent intracranial circulation without flow limiting stenosis    CTA NECK: Patent, ECAs, ICAs, no  hemodynamically significant stenosis at  ICA origins by NASCET criteria.  Bilateral vertebral arteries are patent without flow limiting stenosis.      < end of copied text >    MEDICATIONS  (STANDING):  aspirin  chewable 81 milliGRAM(s) Oral daily  atorvastatin 10 milliGRAM(s) Oral at bedtime  enoxaparin Injectable 40 milliGRAM(s) SubCutaneous daily  nortriptyline 25 milliGRAM(s) Oral at bedtime    MEDICATIONS  (PRN):  acetaminophen   Tablet .. 650 milliGRAM(s) Oral every 6 hours PRN Mild Pain (1 - 3)  clonazePAM  Tablet 0.5 milliGRAM(s) Oral two times a day PRN anxiety  ondansetron Injectable 4 milliGRAM(s) IV Push every 6 hours PRN Nausea

## 2021-04-01 ENCOUNTER — TRANSCRIPTION ENCOUNTER (OUTPATIENT)
Age: 66
End: 2021-04-01

## 2021-04-01 VITALS
DIASTOLIC BLOOD PRESSURE: 61 MMHG | TEMPERATURE: 98 F | OXYGEN SATURATION: 100 % | RESPIRATION RATE: 19 BRPM | SYSTOLIC BLOOD PRESSURE: 142 MMHG | HEART RATE: 97 BPM

## 2021-04-01 LAB
CHOLEST SERPL-MCNC: 162 MG/DL — SIGNIFICANT CHANGE UP
COVID-19 SPIKE DOMAIN AB INTERP: POSITIVE
COVID-19 SPIKE DOMAIN ANTIBODY RESULT: 1.41 U/ML — HIGH
CULTURE RESULTS: SIGNIFICANT CHANGE UP
HCV AB S/CO SERPL IA: 0.07 S/CO — SIGNIFICANT CHANGE UP (ref 0–0.99)
HCV AB SERPL-IMP: SIGNIFICANT CHANGE UP
HDLC SERPL-MCNC: 67 MG/DL — SIGNIFICANT CHANGE UP
LIPID PNL WITH DIRECT LDL SERPL: 84 MG/DL — SIGNIFICANT CHANGE UP
NON HDL CHOLESTEROL: 95 MG/DL — SIGNIFICANT CHANGE UP
SARS-COV-2 IGG+IGM SERPL QL IA: 1.41 U/ML — HIGH
SARS-COV-2 IGG+IGM SERPL QL IA: POSITIVE
SPECIMEN SOURCE: SIGNIFICANT CHANGE UP
T PALLIDUM AB TITR SER: NEGATIVE — SIGNIFICANT CHANGE UP
TRIGL SERPL-MCNC: 53 MG/DL — SIGNIFICANT CHANGE UP
TSH SERPL-MCNC: 2.64 UU/ML — SIGNIFICANT CHANGE UP (ref 0.34–4.82)

## 2021-04-01 PROCEDURE — 70551 MRI BRAIN STEM W/O DYE: CPT | Mod: 26

## 2021-04-01 PROCEDURE — 99239 HOSP IP/OBS DSCHRG MGMT >30: CPT

## 2021-04-01 PROCEDURE — 99223 1ST HOSP IP/OBS HIGH 75: CPT

## 2021-04-01 PROCEDURE — 93306 TTE W/DOPPLER COMPLETE: CPT | Mod: 26

## 2021-04-01 RX ORDER — ACETAMINOPHEN 500 MG
2 TABLET ORAL
Qty: 0 | Refills: 0 | DISCHARGE
Start: 2021-04-01

## 2021-04-01 RX ORDER — ASPIRIN/CALCIUM CARB/MAGNESIUM 324 MG
1 TABLET ORAL
Qty: 30 | Refills: 0
Start: 2021-04-01 | End: 2021-04-30

## 2021-04-01 RX ORDER — JNJ-78436735 50000000000 [PFU]/.5ML
0.5 SUSPENSION INTRAMUSCULAR
Qty: 0 | Refills: 0 | DISCHARGE

## 2021-04-01 RX ADMIN — ENOXAPARIN SODIUM 40 MILLIGRAM(S): 100 INJECTION SUBCUTANEOUS at 10:01

## 2021-04-01 RX ADMIN — Medication 0.25 MILLIGRAM(S): at 11:20

## 2021-04-01 RX ADMIN — Medication 81 MILLIGRAM(S): at 10:01

## 2021-04-01 NOTE — CONSULT NOTE ADULT - SUBJECTIVE AND OBJECTIVE BOX
Patient is a 65y old  Female who presents with a chief complaint of slurred speech, forgetful; TIA (2021 09:21)    ________________________________  ALEM ALCAZAR is a 65y year old Female with a past medical history of depression, HLD, mitral valve regurgitaiton, and depression.  She presents to the ER for evaluation of expressive aphasia that has been admitted.   Her CT showed no CVA and no CVA on MRI.  There was no significant arrhythmias on telemetry.  Her total duration of symptoms was 20 minutes.    She is back to her baseline.  ________________________________  Review of systems: A 10 point review of system has been performed, and is negative except for what has been mentioned in the above history of present illness.     PAST MEDICAL & SURGICAL HISTORY:  As above  HTN (hypertension)  Depression      FAMILY HISTORY:  FH: HTN (hypertension) (Mother)   at 85 yrs    Family history of CVA (Father)   at 92 yrs    SOCIAL HISTORY: The patient denies any history of tobacco abuse, alcohol abuse or illicit drug use.    ALLERGIES:  Lamictal (Unknown)  predniSONE (Unknown)    Home Medications:  clonazePAM 0.5 mg oral tablet: 1 tab(s) orally 2 times a day, As Needed (31 Mar 2021 15:27)  cyanocobalamin 500 mcg oral tablet: 1 tab(s) orally once a day (31 Mar 2021 15:27)  RawData COVID-19 Vaccine preservative-free intramuscular suspension: 0.5 milliliter(s) intramuscular once (31 Mar 2021 15:27)  nortriptyline 25 mg oral capsule: 1 cap(s) orally once a day (at bedtime) (31 Mar 2021 15:27)  pravastatin 20 mg oral tablet: 1 tab(s) orally once a day (31 Mar 2021 15:27)  Vitamin D3 1000 intl units (25 mcg) oral tablet: 1 tab(s) orally once a day (31 Mar 2021 15:27)    MEDICATIONS  (STANDING):  aspirin  chewable 81 milliGRAM(s) Oral daily  atorvastatin 10 milliGRAM(s) Oral at bedtime  enoxaparin Injectable 40 milliGRAM(s) SubCutaneous daily  nortriptyline 25 milliGRAM(s) Oral at bedtime    MEDICATIONS  (PRN):  acetaminophen   Tablet .. 650 milliGRAM(s) Oral every 6 hours PRN Mild Pain (1 - 3)  clonazePAM  Tablet 0.5 milliGRAM(s) Oral two times a day PRN anxiety  ondansetron Injectable 4 milliGRAM(s) IV Push every 6 hours PRN Nausea    Vital Signs Last 24 Hrs  T(C): 36.9 (2021 07:46), Max: 36.9 (31 Mar 2021 20:12)  T(F): 98.4 (2021 07:46), Max: 98.4 (31 Mar 2021 20:12)  HR: 97 (2021 07:46) (74 - 103)  BP: 142/61 (2021 07:46) (122/62 - 142/61)  BP(mean): --  RR: 19 (2021 07:46) (16 - 19)  SpO2: 100% (2021 07:46) (98% - 100%)  I&O's Summary    ________________________________  GENERAL APPEARANCE:  No acute distress  HEAD: normocephalic, atraumatic  NECK: supple, no jugular venous distention, no carotid bruit    HEART: Regular rate and rhythm, S1, S2 normal, 2/6 regurgitant murmur best heard at left sternal border    CHEST:  No anterior chest wall tenderness    LUNGS:  Clear to auscultation, without any wheezing, rhonchi or rales    ABDOMEN: soft, nontender, nondistended, with positive bowel sounds appreciated  EXTREMITIES: no clubbing, cyanosis, or edema.   NEURO: Alert and oriented x3  PSYC:  Normal affect  SKIN:  Dry  ________________________________   TELEMETRY: Sinus rhythm    ECG: Sinus rhythm with nonspecific changes    LABS:                        14.1   6.32  )-----------( 275      ( 31 Mar 2021 11:45 )             43.4             -    139  |  106  |  15  ----------------------------<  104<H>  3.8   |  28  |  0.84    Ca    9.5      31 Mar 2021 11:45    TPro  7.9  /  Alb  4.3  /  TBili  0.8  /  DBili  x   /  AST  13<L>  /  ALT  19  /  AlkPhos  84  0331      LIVER FUNCTIONS - ( 31 Mar 2021 11:45 )  Alb: 4.3 g/dL / Pro: 7.9 gm/dL / ALK PHOS: 84 U/L / ALT: 19 U/L / AST: 13 U/L / GGT: x         PT/INR - ( 31 Mar 2021 11:45 )   PT: 12.9 sec;   INR: 1.11 ratio         PTT - ( 31 Mar 2021 11:45 )  PTT:32.7 sec     @ 11:45  Trop-I  <0.015  CK      --  CK-MB   --  Urinalysis Basic - ( 31 Mar 2021 13:10 )    Color: Yellow / Appearance: Clear / S.020 / pH: x  Gluc: x / Ketone: Negative  / Bili: Negative / Urobili: Negative mg/dL   Blood: x / Protein: Negative mg/dL / Nitrite: Negative   Leuk Esterase: Negative / RBC: x / WBC x   Sq Epi: x / Non Sq Epi: x / Bacteria: x        PT/INR - ( 31 Mar 2021 11:45 )   PT: 12.9 sec;   INR: 1.11 ratio         PTT - ( 31 Mar 2021 11:45 )  PTT:32.7 sec  Urinalysis Basic - ( 31 Mar 2021 13:10 )    Color: Yellow / Appearance: Clear / S.020 / pH: x  Gluc: x / Ketone: Negative  / Bili: Negative / Urobili: Negative mg/dL   Blood: x / Protein: Negative mg/dL / Nitrite: Negative   Leuk Esterase: Negative / RBC: x / WBC x   Sq Epi: x / Non Sq Epi: x / Bacteria: x        ALEM ALCAZAR  CARDIAC MARKERS ( 31 Mar 2021 11:45 )  <0.015 ng/mL / x     / x     / x     / x          ________________________________    RADIOLOGY & ADDITIONAL STUDIES: MRI brain showed no CVA.  Chest x-ray showed no abnormalities  ________________________________    ASSESSMENT:  Rule out TIA  Hypertension  Hyperlipidemia  Mitral valve regurgitation    PLAN:  In summary, this is a 65-year-old female with a past no history of mitral valve regurgitation, hyperlipidemia, and depression admitted for expressive aphasia and rule out TIA.    Imaging modality have been negative for CVA.  No significant arrhythmias on telemetry.  Recommend continuation of aspirin and statin.  Recommend outpatient event monitor for 4 weeks and inpatient echocardiogram.  If recurrence, recommend JANNET and loop recorder.  Discussed with patient, neurology and primary team.      __________________________________________________________________________  Thank you for allowing me to participate in the care of your patient. Please contact me should any questions arise.    FIORELLA Porter DO, [facility]  [phone]    
Patient is a 65y old  Female who presents with a chief complaint of slurred speech, forgetful; TIA       HPI:  65/F with PMHx of HTN, depression, brought to ED yesterday  for c/o slurred speech with difficulty speaking which started yesterday  morning. She had difficulty finding her words and slurred speech which has been intermittent. No hx of similar sx before. Denies any known Hx of CVA, TIA, CAD, PVD. She also states that 2 days ago she couldn't remember the names of people at a party. Speech is better at this time. But not at baseline. No associated headache, focal weakness. called her PCP office recommended to go to ER. CT head and CTA reported neg  for acute pathology. [place] not wearing hearing aids.       PAST MEDICAL & SURGICAL HISTORY:  HTN (hypertension)  Depression    FAMILY HISTORY:  FH: HTN (hypertension) (Mother)   at 85 yrs  Family history of CVA (Father)   at 92 yrs    Social Hx:  Nonsmoker, no drug or alcohol use    MEDICATIONS  (STANDING):  aspirin  chewable 81 milliGRAM(s) Oral daily  atorvastatin 10 milliGRAM(s) Oral at bedtime  enoxaparin Injectable 40 milliGRAM(s) SubCutaneous daily  LORazepam   Injectable 0.25 milliGRAM(s) IV Push once  nortriptyline 25 milliGRAM(s) Oral at bedtime    Allergies    Lamictal (Unknown)  predniSONE (Unknown)    ROS: Pertinent positives in HPI, all other ROS were reviewed and are negative.      Vital Signs Last 24 Hrs  T(C): 36.9 (2021 07:46), Max: 36.9 (31 Mar 2021 20:12)  T(F): 98.4 (2021 07:46), Max: 98.4 (31 Mar 2021 20:12)  HR: 97 (2021 07:46) (74 - 106)  BP: 142/61 (2021 07:46) (122/62 - 142/61)  BP(mean): --  RR: 19 (2021 07:46) (15 - 19)  SpO2: 100% (2021 07:46) (98% - 100%)    Constitutional: awake and alert.  HEENT: PERRLA, EOMI,   Neck: Supple.  Respiratory: Breath sounds are clear bilaterally  Cardiovascular: S1 and S2, regular  rhythm  Gastrointestinal: soft, nontender  Extremities:  no edema  Vascular:  Carotid Bruit - no  Musculoskeletal: no joint swelling/tenderness, no abnormal movements  Skin: No rashes    Neurological exam:  HF: A x O x 3. Appropriately interactive, normal affect. Speech fluent, No Aphasia or paraphasic errors.   CN: DOUG, EOMI, VFF, facial sensation normal, no NLFD, tongue midline, gag intact,[place]  Motor: No pronator drift, Strength 5/5 in all 4 ext, normal tone, no tremors.    Sens: Intact to light touch   Reflexes: Symmetric and normal . BJ 2+, BR 2+, KJ 2+, AJ 2+, downgoing toes b/l  Coord:  No FNFA, dysmetria  Gait/Balance: Normal    NIHSS: 0      Labs:       139  |  106  |  15  ----------------------------<  104<H>  3.8   |  28  |  0.84    Ca    9.5      31 Mar 2021 11:45    TPro  7.9  /  Alb  4.3  /  TBili  0.8  /  DBili  x   /  AST  13<L>  /  ALT  19  /  AlkPhos  84                            14.1   6.32  )-----------( 275      ( 31 Mar 2021 11:45 )             43.4       Radiology:  - CT Head:  < from: CT Brain Stroke Protocol (21 @ 11:43) >  IMPRESSION: Age-appropriate involutional and ischemic gliotic changes. No hemorrhage.    < from: CT Angio Neck w/ IV Cont (21 @ 11:52) >  IMPRESSION:      CT PERFUSION demonstrated: No asymmetric or territorial perfusion abnormality.    If symptoms persist consider follow up head CT or MRI, MRA  if no contraindication.    CTA COW:  Patent intracranial circulation without flow limiting stenosis    CTA NECK: Patent, ECAs, ICAs, no  hemodynamically significant stenosis at  ICA origins by NASCET criteria.  Bilateral vertebral arteries are patent without flow limiting stenosis.

## 2021-04-01 NOTE — PROGRESS NOTE ADULT - SUBJECTIVE AND OBJECTIVE BOX
CHIEF COMPLAINT/DIAGNOSIS: slurred speech, forgetful    HPI: 65/F with PMHx of HTN, depression, brought to ED today for c/o slurred speech with difficulty speaking which started today morning. She had difficulty finding her words and slurred speech which has been intermittent. No hx of similar sx before. Denies any known Hx of CVA, TIA, CAD, PVD. She also states that 2 days ago she couldn't remember the names of people at a party. Speech is better at this time. But not at baseline.     4/1:  REVIEW OF SYSTEMS:  All other review of systems is negative unless indicated above    PHYSICAL EXAM:  Constitutional: NAD, awake and alert, well-developed  HEENT: PERR, EOMI, Normal Hearing, MMM  Neck: Soft and supple, No LAD, No JVD  Respiratory: Breath sounds are clear bilaterally, No wheezing, rales or rhonchi  Cardiovascular: S1 and S2, regular rate and rhythm, no Murmurs, gallops or rubs  Gastrointestinal: Bowel Sounds present, soft, nontender, nondistended, no guarding, no rebound  Extremities: No peripheral edema  Vascular: 2+ peripheral pulses  Neurological: A/O x 3, no focal deficits  Musculoskeletal: 5/5 strength b/l upper and lower extremities  Skin: No rashes    Vital Signs Last 24 Hrs  T(C): 36.9 (01 Apr 2021 07:46), Max: 36.9 (31 Mar 2021 20:12)  T(F): 98.4 (01 Apr 2021 07:46), Max: 98.4 (31 Mar 2021 20:12)  HR: 97 (01 Apr 2021 07:46) (74 - 106)  BP: 142/61 (01 Apr 2021 07:46) (122/62 - 142/61)  BP(mean): --  RR: 19 (01 Apr 2021 07:46) (15 - 19)  SpO2: 100% (01 Apr 2021 07:46) (98% - 100%)    LABS: All Labs Reviewed:                        14.1   6.32  )-----------( 275      ( 31 Mar 2021 11:45 )             43.4     03-31    139  |  106  |  15  ----------------------------<  104<H>  3.8   |  28  |  0.84    Ca    9.5      31 Mar 2021 11:45    TPro  7.9  /  Alb  4.3  /  TBili  0.8  /  DBili  x   /  AST  13<L>  /  ALT  19  /  AlkPhos  84  03-31    PT/INR - ( 31 Mar 2021 11:45 )   PT: 12.9 sec;   INR: 1.11 ratio      PTT - ( 31 Mar 2021 11:45 )  PTT:32.7 sec  CARDIAC MARKERS ( 31 Mar 2021 11:45 )  <0.015 ng/mL / x     / x     / x     / x        RADIOLOGY:  < from: CT Angio Neck w/ IV Cont (03.31.21 @ 11:52) >  IMPRESSION:    CT PERFUSION demonstrated: No asymmetric or territorial perfusion abnormality.  If symptoms persist consider follow up head CT or MRI, MRA  if no contraindication.  CTA COW:  Patent intracranial circulation without flow limiting stenosis  CTA NECK: Patent, ECAs, ICAs, no  hemodynamically significant stenosis at  ICA origins by NASCET criteria.  Bilateral vertebral arteries are patent without flow limiting stenosis.  < end of copied text >    < from: Xray Chest 1 View- PORTABLE-Urgent (Xray Chest 1 View- PORTABLE-Urgent .) (03.31.21 @ 12:17) >  IMPRESSION: No acute finding or change.  < end of copied text >    ECHOCARDIOGRAM: pending     MEDICATIONS  (STANDING):  aspirin  chewable 81 milliGRAM(s) Oral daily  atorvastatin 10 milliGRAM(s) Oral at bedtime  enoxaparin Injectable 40 milliGRAM(s) SubCutaneous daily  LORazepam   Injectable 0.25 milliGRAM(s) IV Push once  nortriptyline 25 milliGRAM(s) Oral at bedtime    MEDICATIONS  (PRN):  acetaminophen   Tablet .. 650 milliGRAM(s) Oral every 6 hours PRN Mild Pain (1 - 3)  clonazePAM  Tablet 0.5 milliGRAM(s) Oral two times a day PRN anxiety  ondansetron Injectable 4 milliGRAM(s) IV Push every 6 hours PRN Nausea    TELEMETRY REVIEW:  4/1: sinus, sinus tachy to 60-110s    ASSESSMENT AND PLAN:    1) TIA -- r/o CVA  - tele monitoring. tele review as above. no arrhythmias noted.  - CTA as above. no infarct/ICA stenosis. f/u MRI Head  - lipids, a1c, RPR pending   - f/u echo  - Cont. ASA, statin  - neuro, cardio eval     Hx HTN  - BP optimal, currently not on medications    Depression/anxiety  - Cont home meds ~ Laura Vallejo     DVT PPX  - SQ Lovenox    PT Eval >    PCP: Keisha     CHIEF COMPLAINT/DIAGNOSIS: slurred speech, forgetful    HPI: 65/F with PMHx of HTN, depression, brought to ED today for c/o slurred speech with difficulty speaking which started today morning. She had difficulty finding her words and slurred speech which has been intermittent. No hx of similar sx before. Denies any known Hx of CVA, TIA, CAD, PVD. She also states that 2 days ago she couldn't remember the names of people at a party. Speech is better at this time. But not at baseline.     4/1: no complaints. denies CP or palp.   REVIEW OF SYSTEMS:  All other review of systems is negative unless indicated above    PHYSICAL EXAM:  Constitutional: Awake and alert, well-developed  HEENT: PERR, EOMI, Normal Hearing, MMM  Neck: Soft and supple, No LAD, No JVD  Respiratory: Breath sounds are clear bilaterally, No wheezing, rales or rhonchi  Cardiovascular: S1 and S2, regular rate and rhythm, no Murmurs, gallops or rubs  Gastrointestinal: Bowel Sounds present, soft, nontender, nondistended, no guarding, no rebound  Extremities: No peripheral edema  Vascular: 2+ peripheral pulses  Neurological: A/O x 3, no focal deficits  Musculoskeletal: 5/5 strength b/l upper and lower extremities  Skin: No rashes    Vital Signs Last 24 Hrs  T(C): 36.9 (01 Apr 2021 07:46), Max: 36.9 (31 Mar 2021 20:12)  T(F): 98.4 (01 Apr 2021 07:46), Max: 98.4 (31 Mar 2021 20:12)  HR: 97 (01 Apr 2021 07:46) (74 - 106)  BP: 142/61 (01 Apr 2021 07:46) (122/62 - 142/61)  BP(mean): --  RR: 19 (01 Apr 2021 07:46) (15 - 19)  SpO2: 100% (01 Apr 2021 07:46) (98% - 100%)    LABS: All Labs Reviewed:                        14.1   6.32  )-----------( 275      ( 31 Mar 2021 11:45 )             43.4     03-31    139  |  106  |  15  ----------------------------<  104<H>  3.8   |  28  |  0.84    Ca    9.5      31 Mar 2021 11:45    TPro  7.9  /  Alb  4.3  /  TBili  0.8  /  DBili  x   /  AST  13<L>  /  ALT  19  /  AlkPhos  84  03-31    PT/INR - ( 31 Mar 2021 11:45 )   PT: 12.9 sec;   INR: 1.11 ratio      PTT - ( 31 Mar 2021 11:45 )  PTT:32.7 sec  CARDIAC MARKERS ( 31 Mar 2021 11:45 )  <0.015 ng/mL / x     / x     / x     / x        RADIOLOGY:  < from: CT Angio Neck w/ IV Cont (03.31.21 @ 11:52) >  IMPRESSION:    CT PERFUSION demonstrated: No asymmetric or territorial perfusion abnormality.  If symptoms persist consider follow up head CT or MRI, MRA  if no contraindication.  CTA COW:  Patent intracranial circulation without flow limiting stenosis  CTA NECK: Patent, ECAs, ICAs, no  hemodynamically significant stenosis at  ICA origins by NASCET criteria.  Bilateral vertebral arteries are patent without flow limiting stenosis.  < end of copied text >    < from: Xray Chest 1 View- PORTABLE-Urgent (Xray Chest 1 View- PORTABLE-Urgent .) (03.31.21 @ 12:17) >  IMPRESSION: No acute finding or change.  < end of copied text >    ECHOCARDIOGRAM: pending     MEDICATIONS  (STANDING):  aspirin  chewable 81 milliGRAM(s) Oral daily  atorvastatin 10 milliGRAM(s) Oral at bedtime  enoxaparin Injectable 40 milliGRAM(s) SubCutaneous daily  LORazepam   Injectable 0.25 milliGRAM(s) IV Push once  nortriptyline 25 milliGRAM(s) Oral at bedtime    MEDICATIONS  (PRN):  acetaminophen   Tablet .. 650 milliGRAM(s) Oral every 6 hours PRN Mild Pain (1 - 3)  clonazePAM  Tablet 0.5 milliGRAM(s) Oral two times a day PRN anxiety  ondansetron Injectable 4 milliGRAM(s) IV Push every 6 hours PRN Nausea    TELEMETRY REVIEW:  4/1: sinus, sinus tachy to 60-110s    ASSESSMENT AND PLAN:    1) TIA -- r/o CVA  - tele monitoring. tele review as above. no arrhythmias noted.  - CTA as above. no infarct/ICA stenosis. f/u MRI Head  - lipids, a1c, RPR pending   - f/u echo  - Cont. ASA, statin  - neuro, cardio eval     Hx HTN  - BP optimal, currently not on medications    Depression/anxiety  - Cont home meds ~ Laura Vallejo     DVT PPX  - SQ Lovenox    PT Eval >    PCP: Keisha     CHIEF COMPLAINT/DIAGNOSIS: slurred speech, forgetful    HPI: 65/F with PMHx of HTN, depression, brought to ED today for c/o slurred speech with difficulty speaking which started today morning. She had difficulty finding her words and slurred speech which has been intermittent. No hx of similar sx before. Denies any known Hx of CVA, TIA, CAD, PVD. She also states that 2 days ago she couldn't remember the names of people at a party. Speech is better at this time. But not at baseline.     4/1: no complaints. denies CP or palp.   REVIEW OF SYSTEMS:  All other review of systems is negative unless indicated above    PHYSICAL EXAM:  Constitutional: Awake and alert, well-developed  HEENT: [place], MMM  Neck: Soft and supple   Respiratory: Breath sounds are clear bilaterally   Cardiovascular: S1 and S2, RRR  Gastrointestinal: Bowel Sounds present, soft, nontender, nondistended, no guarding, no rebound  Extremities: No peripheral edema  Vascular: 2+ peripheral pulses  Neurological: A/O x 3, no focal deficits. fluent speech, no facial droop, EOMI. moves all extremities. finger to nose intact.  Musculoskeletal: 5/5 strength b/l upper and lower extremities  Skin: No rashes    Vital Signs Last 24 Hrs  T(C): 36.9 (01 Apr 2021 07:46), Max: 36.9 (31 Mar 2021 20:12)  T(F): 98.4 (01 Apr 2021 07:46), Max: 98.4 (31 Mar 2021 20:12)  HR: 97 (01 Apr 2021 07:46) (74 - 106)  BP: 142/61 (01 Apr 2021 07:46) (122/62 - 142/61)  BP(mean): --  RR: 19 (01 Apr 2021 07:46) (15 - 19)  SpO2: 100% (01 Apr 2021 07:46) (98% - 100%) -- room air    LABS: All Labs Reviewed:                        14.1   6.32  )-----------( 275      ( 31 Mar 2021 11:45 )             43.4     03-31    139  |  106  |  15  ----------------------------<  104<H>  3.8   |  28  |  0.84    Ca    9.5      31 Mar 2021 11:45    TPro  7.9  /  Alb  4.3  /  TBili  0.8  /  DBili  x   /  AST  13<L>  /  ALT  19  /  AlkPhos  84  03-31    PT/INR - ( 31 Mar 2021 11:45 )   PT: 12.9 sec;   INR: 1.11 ratio      PTT - ( 31 Mar 2021 11:45 )  PTT:32.7 sec  CARDIAC MARKERS ( 31 Mar 2021 11:45 )  <0.015 ng/mL / x     / x     / x     / x        RADIOLOGY:  < from: CT Angio Neck w/ IV Cont (03.31.21 @ 11:52) >  IMPRESSION:    CT PERFUSION demonstrated: No asymmetric or territorial perfusion abnormality.  If symptoms persist consider follow up head CT or MRI, MRA  if no contraindication.  CTA COW:  Patent intracranial circulation without flow limiting stenosis  CTA NECK: Patent, ECAs, ICAs, no  hemodynamically significant stenosis at  ICA origins by NASCET criteria.  Bilateral vertebral arteries are patent without flow limiting stenosis.  < end of copied text >    < from: Xray Chest 1 View- PORTABLE-Urgent (Xray Chest 1 View- PORTABLE-Urgent .) (03.31.21 @ 12:17) >  IMPRESSION: No acute finding or change.  < end of copied text >    ECHOCARDIOGRAM: pending     MEDICATIONS  (STANDING):  aspirin  chewable 81 milliGRAM(s) Oral daily  atorvastatin 10 milliGRAM(s) Oral at bedtime  enoxaparin Injectable 40 milliGRAM(s) SubCutaneous daily  LORazepam   Injectable 0.25 milliGRAM(s) IV Push once  nortriptyline 25 milliGRAM(s) Oral at bedtime    MEDICATIONS  (PRN):  acetaminophen   Tablet .. 650 milliGRAM(s) Oral every 6 hours PRN Mild Pain (1 - 3)  clonazePAM  Tablet 0.5 milliGRAM(s) Oral two times a day PRN anxiety  ondansetron Injectable 4 milliGRAM(s) IV Push every 6 hours PRN Nausea    TELEMETRY REVIEW:  4/1: sinus, sinus tachy to 60-110s    ASSESSMENT AND PLAN:    1) TIA -- r/o CVA  - tele monitoring. tele review as above. no arrhythmias noted.  - CTA as above. no infarct/ICA stenosis. f/u MRI Head > no infarct  - lipids, a1c, RPR >> f/u PCP outpatient if labs pending on d/c  - f/u echo  - Cont. ASA (new), statin  - neuro, cardio eval -- event monitor for 4 weeks on d/c    2) Hx HTN  - BP optimal, currently not on medications    3) Depression/anxiety  - Cont home meds ~ Laura Vallejo     4) DVT PPX  - SQ Lovenox    Received COVID vaccine outpatient*    PT Eval > OOB ambulating.    PCP: Keisha     CHIEF COMPLAINT/DIAGNOSIS: slurred speech, forgetful    HPI: 65/F with PMHx of HTN, depression, brought to ED today for c/o slurred speech with difficulty speaking which started today morning. She had difficulty finding her words and slurred speech which has been intermittent. No hx of similar sx before. Denies any known Hx of CVA, TIA, CAD, PVD. She also states that 2 days ago she couldn't remember the names of people at a party. Speech is better at this time. But not at baseline.     4/1: no complaints. denies CP or palp.   REVIEW OF SYSTEMS:  All other review of systems is negative unless indicated above    PHYSICAL EXAM:  Constitutional: Awake and alert, well-developed  HEENT: [place], MMM  Neck: Soft and supple   Respiratory: Breath sounds are clear bilaterally   Cardiovascular: S1 and S2, RRR  Gastrointestinal: Bowel Sounds present, soft, nontender, nondistended, no guarding, no rebound  Extremities: No peripheral edema  Vascular: 2+ peripheral pulses  Neurological: A/O x 3, no focal deficits. fluent speech, no facial droop, EOMI. moves all extremities. finger to nose intact.  Musculoskeletal: 5/5 strength b/l upper and lower extremities  Skin: No rashes    Vital Signs Last 24 Hrs  T(C): 36.9 (01 Apr 2021 07:46), Max: 36.9 (31 Mar 2021 20:12)  T(F): 98.4 (01 Apr 2021 07:46), Max: 98.4 (31 Mar 2021 20:12)  HR: 97 (01 Apr 2021 07:46) (74 - 106)  BP: 142/61 (01 Apr 2021 07:46) (122/62 - 142/61)  BP(mean): --  RR: 19 (01 Apr 2021 07:46) (15 - 19)  SpO2: 100% (01 Apr 2021 07:46) (98% - 100%) -- room air    LABS: All Labs Reviewed:                        14.1   6.32  )-----------( 275      ( 31 Mar 2021 11:45 )             43.4     03-31    139  |  106  |  15  ----------------------------<  104<H>  3.8   |  28  |  0.84    Ca    9.5      31 Mar 2021 11:45    TPro  7.9  /  Alb  4.3  /  TBili  0.8  /  DBili  x   /  AST  13<L>  /  ALT  19  /  AlkPhos  84  03-31    PT/INR - ( 31 Mar 2021 11:45 )   PT: 12.9 sec;   INR: 1.11 ratio      PTT - ( 31 Mar 2021 11:45 )  PTT:32.7 sec  CARDIAC MARKERS ( 31 Mar 2021 11:45 )  <0.015 ng/mL / x     / x     / x     / x        RADIOLOGY:  < from: CT Angio Neck w/ IV Cont (03.31.21 @ 11:52) >  IMPRESSION:    CT PERFUSION demonstrated: No asymmetric or territorial perfusion abnormality.  If symptoms persist consider follow up head CT or MRI, MRA  if no contraindication.  CTA COW:  Patent intracranial circulation without flow limiting stenosis  CTA NECK: Patent, ECAs, ICAs, no  hemodynamically significant stenosis at  ICA origins by NASCET criteria.  Bilateral vertebral arteries are patent without flow limiting stenosis.  < end of copied text >    < from: Xray Chest 1 View- PORTABLE-Urgent (Xray Chest 1 View- PORTABLE-Urgent .) (03.31.21 @ 12:17) >  IMPRESSION: No acute finding or change.  < end of copied text >    ECHOCARDIOGRAM: pending     MEDICATIONS  (STANDING):  aspirin  chewable 81 milliGRAM(s) Oral daily  atorvastatin 10 milliGRAM(s) Oral at bedtime  enoxaparin Injectable 40 milliGRAM(s) SubCutaneous daily  LORazepam   Injectable 0.25 milliGRAM(s) IV Push once  nortriptyline 25 milliGRAM(s) Oral at bedtime    MEDICATIONS  (PRN):  acetaminophen   Tablet .. 650 milliGRAM(s) Oral every 6 hours PRN Mild Pain (1 - 3)  clonazePAM  Tablet 0.5 milliGRAM(s) Oral two times a day PRN anxiety  ondansetron Injectable 4 milliGRAM(s) IV Push every 6 hours PRN Nausea    TELEMETRY REVIEW:  4/1: sinus, sinus tachy to 60-110s    ASSESSMENT AND PLAN:    1) TIA   - CVA RULED OUT  per patient symptoms resolved.  - tele monitoring. tele review as above. no arrhythmias noted.  - CTA as above. no infarct/ICA stenosis. f/u MRI Head > no infarct  - lipids, a1c, RPR >> f/u PCP outpatient if labs pending on d/c  - f/u echo  - Cont. ASA (new), statin  - neuro, cardio eval -- event monitor for 4 weeks on d/c    2) Hx HTN  - BP optimal, currently not on medications    3) Depression/anxiety  - Cont home meds ~ Klonopin, Pamelor     4) DVT PPX  - SQ Lovenox    Received COVID vaccine outpatient*    PT Eval > OOB ambulating.    PCP: Keisha

## 2021-04-01 NOTE — DISCHARGE NOTE PROVIDER - INSTRUCTIONS
Choose foods that are low in salt - examples include: fresh meats, poultry, fish, eggs, milk and yogurt. Avoid packaged/processed foods and excessive table salt use.

## 2021-04-01 NOTE — DISCHARGE NOTE PROVIDER - NSDCMRMEDTOKEN_GEN_ALL_CORE_FT
acetaminophen 325 mg oral tablet: 2 tab(s) orally every 6 hours, As needed, Mild Pain (1 - 3)  Aspirin Enteric Coated 81 mg oral delayed release tablet: 1 tab(s) orally once a day   clonazePAM 0.5 mg oral tablet: 1 tab(s) orally 2 times a day, As Needed  cyanocobalamin 500 mcg oral tablet: 1 tab(s) orally once a day  nortriptyline 25 mg oral capsule: 1 cap(s) orally once a day (at bedtime)  pravastatin 20 mg oral tablet: 1 tab(s) orally once a day  Vitamin D3 1000 intl units (25 mcg) oral tablet: 1 tab(s) orally once a day

## 2021-04-01 NOTE — CONSULT NOTE ADULT - ASSESSMENT
65/F with PMHx of HTN, depression, brought to ED today for c/o slurred speech with difficulty speaking which started today morning. She had difficulty finding her words and slurred speech which has been intermittent.    # TIA can not be ruled out   -CT/ CTA did not reveal any acute pathology  -Rec MRI  brain   -- lipids, a1c, RPR pending   - f/u echo  - Cont. ASA, statin  -Will follow up  -discussed with Pt and NP Reg    # HTN  - BP optimal, currently not on medications    #Depression/anxiety  - Cont home meds ~ Klonopin, Pamelor   Will follow up

## 2021-04-01 NOTE — DISCHARGE NOTE PROVIDER - HOSPITAL COURSE
CHIEF COMPLAINT/DIAGNOSIS: slurred speech, forgetful    HPI: 65/F with PMHx of HTN, depression, brought to ED today for c/o slurred speech with difficulty speaking which started today morning. She had difficulty finding her words and slurred speech which has been intermittent. No hx of similar sx before. Denies any known Hx of CVA, TIA, CAD, PVD. She also states that 2 days ago she couldn't remember the names of people at a party.    4/1: no complaints. denies CP or palp.   REVIEW OF SYSTEMS:  All other review of systems is negative unless indicated above    PHYSICAL EXAM:  Constitutional: Awake and alert, well-developed  HEENT: [place], MMM  Neck: Soft and supple   Respiratory: Breath sounds are clear bilaterally   Cardiovascular: S1 and S2, RRR  Gastrointestinal: Bowel Sounds present, soft, nontender, nondistended, no guarding, no rebound  Extremities: No peripheral edema  Vascular: 2+ peripheral pulses  Neurological: A/O x 3, no focal deficits. fluent speech, no facial droop, EOMI. moves all extremities. finger to nose intact.  Musculoskeletal: 5/5 strength b/l upper and lower extremities  Skin: No rashes    Vital Signs Last 24 Hrs  T(C): 36.9 (01 Apr 2021 07:46), Max: 36.9 (31 Mar 2021 20:12)  T(F): 98.4 (01 Apr 2021 07:46), Max: 98.4 (31 Mar 2021 20:12)  HR: 97 (01 Apr 2021 07:46) (74 - 106)  BP: 142/61 (01 Apr 2021 07:46) (122/62 - 142/61)  BP(mean): --  RR: 19 (01 Apr 2021 07:46) (15 - 19)  SpO2: 100% (01 Apr 2021 07:46) (98% - 100%) -- room air    LABS, RADIOLOGY, MEDS, TELE - all reviewed.     ASSESSMENT AND PLAN:    1) TIA   - CVA RULED OUT  per patient symptoms resolved.  - tele monitoring. tele review as above. no arrhythmias noted.  - CTA as above. no infarct/ICA stenosis. f/u MRI Head > no infarct  - lipids, a1c, RPR >> f/u PCP outpatient if labs pending on d/c  - f/u echo  - Cont. ASA (new), statin  - neuro, cardio eval -- event monitor for 4 weeks on d/c    2) Hx HTN  - BP optimal, currently not on medications    3) Depression/anxiety  - Cont home meds ~ Laura Vallejo     4) DVT PPX  - SQ Lovenox    Received COVID vaccine outpatient*    PT Eval > OOB ambulating.    PCP: Keisha    Dispo: discharge to home in stable condition    Final diagnosis, treatment plan, and follow-up recommendations were discussed and explained to the patient. The patient was given an opportunity to ask questions concerning the diagnosis and treatment plan. The patient acknowledged understanding of the diagnosis, treatment, and follow-up recommendations. The patient was advised to seek urgent care upon discharge if worsening symptoms develop prior to scheduled follow-up. Time spent on discharge included time with the patient, and also coordinating discharge care as outlined below.    Total time spent: 50 min           CHIEF COMPLAINT/DIAGNOSIS: slurred speech, forgetful    HPI: 65/F with PMHx of HTN, depression, brought to ED today for c/o slurred speech with difficulty speaking which started today morning. She had difficulty finding her words and slurred speech which has been intermittent. No hx of similar sx before. Denies any known Hx of CVA, TIA, CAD, PVD. She also states that 2 days ago she couldn't remember the names of people at a party.    4/1: no complaints. denies CP or palp.   REVIEW OF SYSTEMS:  All other review of systems is negative unless indicated above    PHYSICAL EXAM:  Constitutional: Awake and alert, well-developed  HEENT: [place], MMM  Neck: Soft and supple   Respiratory: Breath sounds are clear bilaterally   Cardiovascular: S1 and S2, RRR  Gastrointestinal: Bowel Sounds present, soft, nontender, nondistended, no guarding, no rebound  Extremities: No peripheral edema  Vascular: 2+ peripheral pulses  Neurological: A/O x 3, no focal deficits. fluent speech, no facial droop, EOMI. moves all extremities. finger to nose intact.  Musculoskeletal: 5/5 strength b/l upper and lower extremities  Skin: No rashes    Vital Signs Last 24 Hrs  T(C): 36.9 (01 Apr 2021 07:46), Max: 36.9 (31 Mar 2021 20:12)  T(F): 98.4 (01 Apr 2021 07:46), Max: 98.4 (31 Mar 2021 20:12)  HR: 97 (01 Apr 2021 07:46) (74 - 106)  BP: 142/61 (01 Apr 2021 07:46) (122/62 - 142/61)  BP(mean): --  RR: 19 (01 Apr 2021 07:46) (15 - 19)  SpO2: 100% (01 Apr 2021 07:46) (98% - 100%) -- room air    LABS, RADIOLOGY, MEDS, TELE - all reviewed.     ASSESSMENT AND PLAN:    1) TIA   - CVA RULED OUT  per patient symptoms resolved.  - tele monitoring. tele review as above. no arrhythmias noted.  - CTA as above. no infarct/ICA stenosis. f/u MRI Head > no infarct  - lipids, a1c, RPR >> f/u PCP outpatient if labs pending on d/c  - f/u echo > d/c after echo read/reviewed   - Cont. ASA (new), statin  - neuro, cardio eval -- event monitor for 4 weeks on d/c    2) Hx HTN  - BP optimal, currently not on medications    3) Depression/anxiety  - Cont home meds ~ Laura Vallejo     4) DVT PPX  - SQ Lovenox    Received COVID vaccine outpatient*    PT Eval > OOB ambulating.    PCP: Keisha    Dispo: discharge to home in stable condition    Final diagnosis, treatment plan, and follow-up recommendations were discussed and explained to the patient. The patient was given an opportunity to ask questions concerning the diagnosis and treatment plan. The patient acknowledged understanding of the diagnosis, treatment, and follow-up recommendations. The patient was advised to seek urgent care upon discharge if worsening symptoms develop prior to scheduled follow-up. Time spent on discharge included time with the patient, and also coordinating discharge care as outlined below.    Total time spent: 50 min           CHIEF COMPLAINT/DIAGNOSIS: slurred speech, forgetful    HPI: 65/F with PMHx of HTN, depression, brought to ED today for c/o slurred speech with difficulty speaking which started today morning. She had difficulty finding her words and slurred speech which has been intermittent. No hx of similar sx before. Denies any known Hx of CVA, TIA, CAD, PVD. She also states that 2 days ago she couldn't remember the names of people at a party.    4/1: no complaints. denies CP or palp.   REVIEW OF SYSTEMS:  All other review of systems is negative unless indicated above    PHYSICAL EXAM:  Constitutional: Awake and alert, well-developed  HEENT: [place], MMM  Neck: Soft and supple   Respiratory: Breath sounds are clear bilaterally   Cardiovascular: S1 and S2, RRR  Gastrointestinal: Bowel Sounds present, soft, nontender, nondistended, no guarding, no rebound  Extremities: No peripheral edema  Vascular: 2+ peripheral pulses  Neurological: A/O x 3, no focal deficits. fluent speech, no facial droop, EOMI. moves all extremities. finger to nose intact.  Musculoskeletal: 5/5 strength b/l upper and lower extremities  Skin: No rashes    Vital Signs Last 24 Hrs  T(C): 36.9 (01 Apr 2021 07:46), Max: 36.9 (31 Mar 2021 20:12)  T(F): 98.4 (01 Apr 2021 07:46), Max: 98.4 (31 Mar 2021 20:12)  HR: 97 (01 Apr 2021 07:46) (74 - 106)  BP: 142/61 (01 Apr 2021 07:46) (122/62 - 142/61)  BP(mean): --  RR: 19 (01 Apr 2021 07:46) (15 - 19)  SpO2: 100% (01 Apr 2021 07:46) (98% - 100%) -- room air    LABS, RADIOLOGY, MEDS, TELE - all reviewed.     ASSESSMENT AND PLAN:    1) TIA   - CVA RULED OUT  per patient symptoms resolved.  - tele monitoring. tele review as above. no arrhythmias noted.  - CTA as above. no infarct/ICA stenosis. f/u MRI Head > no infarct  - lipids, a1c, RPR >> f/u PCP outpatient if labs pending on d/c  - f/u echo > d/c after echo read/reviewed   - Cont. ASA (new), statin  - neuro, cardio eval -- event monitor for 4 weeks on d/c    2) Hx HTN  - BP optimal, currently not on medications    3) Depression/anxiety  - Cont home meds ~ Laura Vallejo     4) DVT PPX  - SQ Lovenox    Received COVID vaccine outpatient*    PT Eval > OOB ambulating.    PCP: Keisha    Dispo: discharge to home in stable condition    Final diagnosis, treatment plan, and follow-up recommendations were discussed and explained to the patient. The patient was given an opportunity to ask questions concerning the diagnosis and treatment plan. The patient acknowledged understanding of the diagnosis, treatment, and follow-up recommendations. The patient was advised to seek urgent care upon discharge if worsening symptoms develop prior to scheduled follow-up. Time spent on discharge included time with the patient, and also coordinating discharge care as outlined below.    Total time spent: 50 min      I have seen/examined pt, agree with treatment plan as above. Briefly, TIA with workup as above. Started on ASA, statin continued. Outpt event monitor. Will need to f/u on final echo read.  -Kori Flores MD, [phone]

## 2021-04-01 NOTE — DISCHARGE NOTE PROVIDER - NSDCCAREPROVSEEN_GEN_ALL_CORE_FT
Diana Corley (Nurse Practitioner)   Kori Flores (Hospitalist)  Chrissy Porter (Cardiologist)  Nikkie Alejandre (Neurologist)

## 2021-04-01 NOTE — PHYSICAL THERAPY INITIAL EVALUATION ADULT - PERTINENT HX OF CURRENT PROBLEM, REHAB EVAL
65/F with PMHx of HTN, depression, brought to ED today for c/o slurred speech with difficulty speaking. She had difficulty finding her words and slurred speech which has been intermittent. CT head, CTA head/neck neg for acute CVA.

## 2021-04-01 NOTE — DISCHARGE NOTE PROVIDER - NSDCCPCAREPLAN_GEN_ALL_CORE_FT
PRINCIPAL DISCHARGE DIAGNOSIS  Diagnosis: TIA (transient ischemic attack)  Assessment and Plan of Treatment: WHAT IS A TRANSIENT ISCHEMIC ATTACK? A transient ischemic attack (TIA) or mini stroke occurs when blood cannot flow to part of the brain.  THINGS TO DO: (1) Manage your health conditions like diabetes or hypertension (2) Monitor your blood pressure (3) Avoid nicotine products – smoking can cause damage to your blood vessels and increase your risk for a stroke (4) Maintain a healthy weight (5) Stay active with exercise (6) Limit or avoid alcohol intake – alcohol can raise your blood pressure (7) Eat heart healthy foods like fruits, vegetables, and whole grains  MONITOR THESE SIGNS AND SYMPTOMS: (1) Numbness or drooping of one side of your face (2) Weakness of your arm or leg (3) Confusion or difficulty speaking /slurred speech. If you experience any of these, DO alert your primary care provider, or return to the Emergency Department if you feel very sick.   MEDICATIONS:  - Start Aspirin 81mg once a day (new medication, sent to pharmacy) and Pravastatin 20mg once a day at bedtime (home medication)  FOLLOW UP APPOINTMENTS:  - Follow up with your Cardiologist (Dr. Porter) in week after discharge for results of your outstanding labs tests (lipid profile, a1c) and further management. Holter Monitor to be set up by Dr. Porter to monitor your heart rate/rhythm.

## 2021-04-01 NOTE — DISCHARGE NOTE PROVIDER - CARE PROVIDER_API CALL
Charmaine Valdes)  Internal Medicine  [address] [place] [address]  Phone: [phone]  Fax: [phone]  Follow Up Time:     Chrissy Porter (DO; STEFFANIE)  Cardiology  [address] [place] [address]  Phone: [phone]  Fax: [phone]  Follow Up Time:

## 2021-04-01 NOTE — DISCHARGE NOTE NURSING/CASE MANAGEMENT/SOCIAL WORK - PATIENT PORTAL LINK FT
You can access the FollowMyHealth Patient Portal offered by [facility] by registering at the following website: [email or web address]. By joining [employer]’s FollowMyHealth portal, you will also be able to view your health information using other applications (apps) compatible with our system.

## 2021-04-08 DIAGNOSIS — G45.9 TRANSIENT CEREBRAL ISCHEMIC ATTACK, UNSPECIFIED: ICD-10-CM

## 2021-04-08 DIAGNOSIS — R47.01 APHASIA: ICD-10-CM

## 2021-04-08 DIAGNOSIS — F41.8 OTHER SPECIFIED ANXIETY DISORDERS: ICD-10-CM

## 2021-04-08 DIAGNOSIS — Z82.3 FAMILY HISTORY OF STROKE: ICD-10-CM

## 2021-04-08 DIAGNOSIS — R29.700 NIHSS SCORE 0: ICD-10-CM

## 2021-04-08 DIAGNOSIS — Z82.49 FAMILY HISTORY OF ISCHEMIC HEART DISEASE AND OTHER DISEASES OF THE CIRCULATORY SYSTEM: ICD-10-CM

## 2021-04-08 DIAGNOSIS — R47.81 SLURRED SPEECH: ICD-10-CM

## 2021-04-08 DIAGNOSIS — I10 ESSENTIAL (PRIMARY) HYPERTENSION: ICD-10-CM

## 2021-04-08 DIAGNOSIS — H91.90 UNSPECIFIED HEARING LOSS, UNSPECIFIED EAR: ICD-10-CM

## 2021-04-08 DIAGNOSIS — Z79.899 OTHER LONG TERM (CURRENT) DRUG THERAPY: ICD-10-CM

## 2021-04-08 DIAGNOSIS — Z79.82 LONG TERM (CURRENT) USE OF ASPIRIN: ICD-10-CM

## 2021-08-30 ENCOUNTER — TRANSCRIPTION ENCOUNTER (OUTPATIENT)
Age: 66
End: 2021-08-30

## 2021-10-28 NOTE — ED ADULT NURSE NOTE - CAS EDN DISCHARGE INTERVENTIONS
Arm band on/IV intact/Admission wristband placed PAST MEDICAL HISTORY:  Atrial flutter Atrial flutter    CAD (coronary artery disease)     Essential hypertension Hypertension    Primary cardiomyopathy Cardiomyopathy    PVD (peripheral vascular disease)     Type 2 diabetes mellitus Diabetes

## 2021-12-08 ENCOUNTER — APPOINTMENT (OUTPATIENT)
Dept: DERMATOLOGY | Facility: CLINIC | Age: 66
End: 2021-12-08
Payer: MEDICARE

## 2021-12-08 VITALS — BODY MASS INDEX: 26.66 KG/M2 | WEIGHT: 160 LBS | HEIGHT: 65 IN

## 2021-12-08 DIAGNOSIS — L24.9 IRRITANT CONTACT DERMATITIS, UNSPECIFIED CAUSE: ICD-10-CM

## 2021-12-08 PROCEDURE — 99213 OFFICE O/P EST LOW 20 MIN: CPT

## 2021-12-08 NOTE — HISTORY OF PRESENT ILLNESS
[FreeTextEntry1] : Patient presents for skin examination. [de-identified] : Denies new, changing, bleeding or tender lesions on the skin over the past year.\par

## 2021-12-08 NOTE — ASSESSMENT
[FreeTextEntry1] : A complete skin examination was performed.  There is no evidence of skin cancer.  We discussed the importance of photoprotection, including the use of hats, protective clothing and sunscreens with an SPF of at least 30.  Sun avoidance was also discussed.  The ABCDE's of melanoma was discussed.  Regular skin exams recommended.\par \par Irritant dermatitis, likely from tears at night.\par Message to lacrimal duct region during the day.\par Vaseline to lateral canthal regions qhs.

## 2021-12-08 NOTE — PHYSICAL EXAM
[Alert] : alert [Oriented x 3] : ~L oriented x 3 [Well Nourished] : well nourished [Full Body Skin Exam Performed] : performed [FreeTextEntry3] : A full skin exam was performed including the scalp, face, neck, chest, abdomen, back, buttocks, upper extremities and lower extremities.  The patient declined examination of the breasts and genitalia.  \par The exam did show the following benign growths:\par Seborrheic keratoses.\par Lentigines.\par Cherry angioma.\par \par Erythema, mild scale, bilateral lateral canthal regions.

## 2022-02-28 ENCOUNTER — TRANSCRIPTION ENCOUNTER (OUTPATIENT)
Age: 67
End: 2022-02-28

## 2022-06-16 ENCOUNTER — EMERGENCY (EMERGENCY)
Facility: HOSPITAL | Age: 67
LOS: 0 days | Discharge: HOME | End: 2022-06-17
Attending: EMERGENCY MEDICINE
Payer: MEDICARE

## 2022-06-16 ENCOUNTER — EMERGENCY (EMERGENCY)
Facility: HOSPITAL | Age: 67
LOS: 0 days | Discharge: HOME | End: 2022-06-16
Attending: EMERGENCY MEDICINE
Payer: MEDICARE

## 2022-06-16 VITALS
TEMPERATURE: 98 F | OXYGEN SATURATION: 98 % | DIASTOLIC BLOOD PRESSURE: 61 MMHG | HEART RATE: 81 BPM | SYSTOLIC BLOOD PRESSURE: 109 MMHG | RESPIRATION RATE: 18 BRPM

## 2022-06-16 VITALS — HEIGHT: 65 IN | WEIGHT: 160.06 LBS

## 2022-06-16 VITALS
HEART RATE: 105 BPM | TEMPERATURE: 98 F | DIASTOLIC BLOOD PRESSURE: 79 MMHG | OXYGEN SATURATION: 100 % | SYSTOLIC BLOOD PRESSURE: 140 MMHG | RESPIRATION RATE: 18 BRPM

## 2022-06-16 VITALS
OXYGEN SATURATION: 93 % | DIASTOLIC BLOOD PRESSURE: 58 MMHG | RESPIRATION RATE: 18 BRPM | SYSTOLIC BLOOD PRESSURE: 102 MMHG | HEART RATE: 85 BPM | WEIGHT: 160.06 LBS | HEIGHT: 65 IN | TEMPERATURE: 98 F

## 2022-06-16 DIAGNOSIS — M25.512 PAIN IN LEFT SHOULDER: ICD-10-CM

## 2022-06-16 DIAGNOSIS — Z88.8 ALLERGY STATUS TO OTHER DRUGS, MEDICAMENTS AND BIOLOGICAL SUBSTANCES: ICD-10-CM

## 2022-06-16 DIAGNOSIS — I10 ESSENTIAL (PRIMARY) HYPERTENSION: ICD-10-CM

## 2022-06-16 DIAGNOSIS — S20.212A CONTUSION OF LEFT FRONT WALL OF THORAX, INITIAL ENCOUNTER: ICD-10-CM

## 2022-06-16 DIAGNOSIS — Z88.2 ALLERGY STATUS TO SULFONAMIDES: ICD-10-CM

## 2022-06-16 DIAGNOSIS — E78.5 HYPERLIPIDEMIA, UNSPECIFIED: ICD-10-CM
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
## 2022-06-16 DIAGNOSIS — F32.A DEPRESSION, UNSPECIFIED: ICD-10-CM

## 2022-06-16 DIAGNOSIS — S43.005A UNSPECIFIED DISLOCATION OF LEFT SHOULDER JOINT, INITIAL ENCOUNTER: ICD-10-CM

## 2022-06-16 DIAGNOSIS — Z79.82 LONG TERM (CURRENT) USE OF ASPIRIN: ICD-10-CM

## 2022-06-16 PROCEDURE — 96374 THER/PROPH/DIAG INJ IV PUSH: CPT

## 2022-06-16 PROCEDURE — 99284 EMERGENCY DEPT VISIT MOD MDM: CPT

## 2022-06-16 PROCEDURE — 93010 ELECTROCARDIOGRAM REPORT: CPT

## 2022-06-16 PROCEDURE — 73030 X-RAY EXAM OF SHOULDER: CPT | Mod: 26,77,LT

## 2022-06-16 PROCEDURE — 99284 EMERGENCY DEPT VISIT MOD MDM: CPT | Mod: FS

## 2022-06-16 PROCEDURE — 99285 EMERGENCY DEPT VISIT HI MDM: CPT | Mod: 25

## 2022-06-16 PROCEDURE — 82962 GLUCOSE BLOOD TEST: CPT

## 2022-06-16 PROCEDURE — 73030 X-RAY EXAM OF SHOULDER: CPT | Mod: LT

## 2022-06-16 PROCEDURE — 73030 X-RAY EXAM OF SHOULDER: CPT | Mod: 26,LT

## 2022-06-16 PROCEDURE — 96375 TX/PRO/DX INJ NEW DRUG ADDON: CPT

## 2022-06-16 PROCEDURE — 73020 X-RAY EXAM OF SHOULDER: CPT | Mod: LT

## 2022-06-16 PROCEDURE — 93005 ELECTROCARDIOGRAM TRACING: CPT

## 2022-06-16 RX ORDER — MORPHINE SULFATE 50 MG/1
4 CAPSULE, EXTENDED RELEASE ORAL ONCE
Refills: 0 | Status: DISCONTINUED | OUTPATIENT
Start: 2022-06-16 | End: 2022-06-16

## 2022-06-16 RX ORDER — DIAZEPAM 5 MG
5 TABLET ORAL ONCE
Refills: 0 | Status: DISCONTINUED | OUTPATIENT
Start: 2022-06-16 | End: 2022-06-16

## 2022-06-16 RX ADMIN — MORPHINE SULFATE 4 MILLIGRAM(S): 50 CAPSULE, EXTENDED RELEASE ORAL at 13:37

## 2022-06-16 RX ADMIN — Medication 5 MILLIGRAM(S): at 13:37

## 2022-06-16 RX ADMIN — MORPHINE SULFATE 4 MILLIGRAM(S): 50 CAPSULE, EXTENDED RELEASE ORAL at 12:08

## 2022-06-16 NOTE — ED STATDOCS - PROGRESS NOTE DETAILS
67 y/o F with PMH of HTN, depression presents with mechanical trip & fall with left shoulder pain. Denies LOC, head trauma. Takes 81mg ASA daily. PE: Uncomfortable appearing. MSK: +deformity to left shoulder. Unable to range shoulder 2/2 pain. Sensation intact to light touch in upper extremities. 5/5  strength. Cap refill < 2 sec in upper extremities. A/P: concern for fracture, dislocation. Plan for XR, analgesia, reassess. - Ravindra Hernandez PA-C Shoulder reduced, placed in sling after procedure, confirmed with XR, WIll dc with orthopedic follow up. - KULDIP LariosC

## 2022-06-16 NOTE — ED STATDOCS - NSFOLLOWUPINSTRUCTIONS_ED_ALL_ED_FT
Shoulder Dislocation       A shoulder dislocation happens when the upper arm bone (humerus) moves out of the shoulder joint. The shoulder joint is the part of the shoulder where the humerus, shoulder blade (scapula), and collarbone (clavicle) meet.      What are the causes?    This condition is often caused by:  •A fall.      •A hard, direct hit to the shoulder.      •A forceful movement of the shoulder.        What increases the risk?    You are more likely to develop this condition if you play sports.      What are the signs or symptoms?     Symptoms of this condition include:  •Deformity of the shoulder.      •Severe pain.      •Inability to move the shoulder.      •Numbness, weakness, or tingling in your neck or down your arm.      •Bruising or swelling around your shoulder.        How is this diagnosed?    This condition is diagnosed with a physical exam. After the exam, tests may be done to check for related problems. Tests may include:  •X-ray. This may be done to check for broken bones.      •MRI. This may be done to check for damage to the tissues around the shoulder.      •Electromyogram. This may be done to check for nerve damage.        How is this treated?    This condition is treated with a procedure to place the humerus back in the joint. This procedure is called a reduction. There are two types of reduction:  •Closed reduction. The humerus is placed back in the joint without surgery. The health care provider uses his or her hands to guide the bone back into place.    •Open reduction. Surgery is done to place the humerus back in the joint. An open reduction may be recommended if:  •You have a weak shoulder joint or weak ligaments.      •You have had more than one shoulder dislocation.      •The nerves or blood vessels around your shoulder have been damaged.        After reduction, your shoulder and arm will be placed in a brace or sling to prevent it from moving.    After the brace or sling is removed, you will have physical therapy to help improve the range of motion in your shoulder joint.      Follow these instructions at home:    Medicines     •Take over-the-counter and prescription medicines only as told by your health care provider.    •Ask your health care provider if the medicine prescribed to you:  •Requires you to avoid driving or using heavy machinery.    •Can cause constipation. You may need to take these actions to prevent or treat constipation:  •Drink enough fluid to keep your urine pale yellow.      •Take over-the-counter or prescription medicines.      •Eat foods that are high in fiber, such as beans, whole grains, and fresh fruits and vegetables.      •Limit foods that are high in fat and processed sugars, such as fried or sweet foods.          If you have a brace or sling:     •Wear the brace or sling as told by your health care provider. Remove it only as told by your health care provider.      •Loosen the brace or sling if your fingers tingle, become numb, or turn cold and blue.      •Keep the brace or sling clean.    •If the brace or sling is not waterproof:  •Do not let it get wet.      •Cover it with a watertight covering when you take a bath or shower.          Managing pain, stiffness, and swelling    •If directed, put ice on the injured area.  •If you have a removable brace or sling, remove it as told by your health care provider.      •Put ice in a plastic bag.      •Place a towel between your skin and the bag.      •Leave the ice on for 20 minutes, 2–3 times per day.        •Move your fingers often to reduce stiffness and swelling.      •Raise (elevate) the injured area above the level of your heart while you are sitting or lying down.      Activity     • Do not lift your arm above shoulder level until your health care provider approves.      • Do not lift anything until your health care provider says that it is safe.      • Do not push or pull things until your health care provider approves.      •Return to your normal activities as told by your health care provider. Ask your health care provider what activities are safe for you.      •Perform range-of-motion exercises only as told by your health care provider.      •Exercise your hand by squeezing a soft ball. This helps to decrease stiffness and swelling in your hand and wrist.      General instructions     • Do not drive while wearing a brace or sling on a hand that you use for driving. Ask your health care provider when it is safe to drive after the brace or sling is removed.      • Do not take baths, swim, or use a hot tub until your health care provider approves. Ask your health care provider if you may take showers. You may only be allowed to take sponge baths.      • Do not use any products that contain nicotine or tobacco, such as cigarettes, e-cigarettes, and chewing tobacco. These can delay healing. If you need help quitting, ask your health care provider.      •Keep all follow-up visits as told by your health care provider. This is important.        Contact a health care provider if:    •Your brace or sling gets damaged.        Get help right away if:    •Your pain gets worse rather than better.      •You lose feeling in your arm or hand.      •Your arm or hand becomes white and cold.        Summary    •A shoulder dislocation happens when the upper arm bone moves out of the shoulder joint.      •It is usually caused by a fall, a strong hit to the shoulder, or a forceful movement of the shoulder.      •It causes severe pain, inability to move the shoulder, numbness, weakness, or tingling.      •This condition is treated with either closed or open reduction. You will also be given a brace or sling. You will do exercises to increase your shoulder's range of motion.      •Contact a health care provider if your brace or sling gets damaged. Get help right away if your pain gets worse, you lose feeling in your arm or hand, or your arm or hand becomes white or cold.      This information is not intended to replace advice given to you by your health care provider. Make sure you discuss any questions you have with your health care provider.

## 2022-06-16 NOTE — ED STATDOCS - MUSCULOSKELETAL, MLM
range of motion is not limited, +unable to lift L shoulder without significant pain, n/v intact, dislocated left shoulder

## 2022-06-16 NOTE — ED ADULT TRIAGE NOTE - CHIEF COMPLAINT QUOTE
PT P/W c/o mechanical trip and fall injuring her left shoulder.  Pt denies head strike or LOC.  Pt alert and ambulatory in triage.

## 2022-06-16 NOTE — ED ADULT TRIAGE NOTE - CHIEF COMPLAINT QUOTE
had left shoulder dislocation reduced earlier today. noticed large hematoma under left arm upon returning home. complains of discomfort to area.

## 2022-06-16 NOTE — ED STATDOCS - CLINICAL SUMMARY MEDICAL DECISION MAKING FREE TEXT BOX
exam consistent with anterior dislocation for shoulder, will evaluate for underlying fracture, dispo pending imaging and reduction

## 2022-06-16 NOTE — ED ADULT NURSE NOTE - OBJECTIVE STATEMENT
Pt comes to the ED s/p mechanical trip and fall into dresser injuring her left shoulder. Pt with obvious deformity and pain.

## 2022-06-16 NOTE — ED STATDOCS - PATIENT PORTAL LINK FT
You can access the FollowMyHealth Patient Portal offered by [facility] by registering at the following website: [email or web address]. By joining [employer]’s FollowMyHealth portal, you will also be able to view your health information using other applications (apps) compatible with our system.

## 2022-06-16 NOTE — ED STATDOCS - NS ED ATTENDING STATEMENT MOD
This was a shared visit with the KATIE. I reviewed and verified the documentation and independently performed the documented:

## 2022-06-16 NOTE — ED STATDOCS - OBJECTIVE STATEMENT
67 y/o female with PMHx of depression and HTN presents to ED s/p mechanical trip and fall c/o left shoulder pain. Pt states she was helping someone pack, was wearing flip flops, tripped over rug and fell into dress. Denies head trauma, denies LOC. On baby aspirin, no other ac.

## 2022-06-17 LAB
BASOPHILS # BLD AUTO: 0.03 K/UL — SIGNIFICANT CHANGE UP (ref 0–0.2)
BASOPHILS NFR BLD AUTO: 0.3 % — SIGNIFICANT CHANGE UP (ref 0–2)
EOSINOPHIL # BLD AUTO: 0 K/UL — SIGNIFICANT CHANGE UP (ref 0–0.5)
EOSINOPHIL NFR BLD AUTO: 0 % — SIGNIFICANT CHANGE UP (ref 0–6)
HCT VFR BLD CALC: 38.3 % — SIGNIFICANT CHANGE UP (ref 34.5–45)
HGB BLD-MCNC: 12.6 G/DL — SIGNIFICANT CHANGE UP (ref 11.5–15.5)
IMM GRANULOCYTES NFR BLD AUTO: 0.3 % — SIGNIFICANT CHANGE UP (ref 0–1.5)
LYMPHOCYTES # BLD AUTO: 1.08 K/UL — SIGNIFICANT CHANGE UP (ref 1–3.3)
LYMPHOCYTES # BLD AUTO: 9.4 % — LOW (ref 13–44)
MCHC RBC-ENTMCNC: 30.3 PG — SIGNIFICANT CHANGE UP (ref 27–34)
MCHC RBC-ENTMCNC: 32.9 GM/DL — SIGNIFICANT CHANGE UP (ref 32–36)
MCV RBC AUTO: 92.1 FL — SIGNIFICANT CHANGE UP (ref 80–100)
MONOCYTES # BLD AUTO: 0.73 K/UL — SIGNIFICANT CHANGE UP (ref 0–0.9)
MONOCYTES NFR BLD AUTO: 6.4 % — SIGNIFICANT CHANGE UP (ref 2–14)
NEUTROPHILS # BLD AUTO: 9.56 K/UL — HIGH (ref 1.8–7.4)
NEUTROPHILS NFR BLD AUTO: 83.6 % — HIGH (ref 43–77)
PLATELET # BLD AUTO: 240 K/UL — SIGNIFICANT CHANGE UP (ref 150–400)
RBC # BLD: 4.16 M/UL — SIGNIFICANT CHANGE UP (ref 3.8–5.2)
RBC # FLD: 13.5 % — SIGNIFICANT CHANGE UP (ref 10.3–14.5)
WBC # BLD: 11.44 K/UL — HIGH (ref 3.8–10.5)
WBC # FLD AUTO: 11.44 K/UL — HIGH (ref 3.8–10.5)

## 2022-06-17 PROCEDURE — 71046 X-RAY EXAM CHEST 2 VIEWS: CPT | Mod: 26

## 2022-06-17 RX ORDER — ACETAMINOPHEN 500 MG
650 TABLET ORAL ONCE
Refills: 0 | Status: COMPLETED | OUTPATIENT
Start: 2022-06-17 | End: 2022-06-17

## 2022-06-17 RX ADMIN — Medication 650 MILLIGRAM(S): at 00:30

## 2022-06-17 NOTE — ED PROVIDER NOTE - WR INTERPRETATION 1
MSK XR negative - No fracture, No dislocation, No foreign bodyCXR negative - No infiltrates, No consolidation, No atelectasis seen

## 2022-06-17 NOTE — ED PROVIDER NOTE - PATIENT PORTAL LINK FT
You can access the FollowMyHealth Patient Portal offered by [facility] by registering at the following website: [email or web address]. By joining [employer]’s FollowMyHealth portal, you will also be able to view your health information using other applications (apps) compatible with our system.

## 2022-06-17 NOTE — ED PROVIDER NOTE - OBJECTIVE STATEMENT
58-year-old female with history of hypertension, hyperlipidemia on 81 mg of aspirin but no other blood thinners presents with left anterior chest wall ecchymosis status post trip and fall earlier today.  The patient was seen in the emergency department after she had a mechanical fall in her house landing on her left shoulder resulting in anterior shoulder dislocation that was reduced in the emergency department.  Patient returned today because she became concerned about the area of ecchymosis.  Patient denies any chest pain or shortness of breath at this time.  Patient denies any other complaints.

## 2022-06-17 NOTE — ED ADULT NURSE NOTE - NS ED NURSE LEVEL OF CONSCIOUSNESS SPEECH
Speaking Coherently
Asthma  h/o childhood  BPH (benign prostatic hypertrophy)    Diabetes mellitus  type II  Dyslipidemia    Inguinal hernia  Left  Intracranial hemorrhage  h/o 20 yrs ago  Obesity    Obstructive sleep apnea  on CPAP at 10  PVD (peripheral vascular disease)    Umbilical hernia

## 2022-06-17 NOTE — ED ADULT NURSE NOTE - OBJECTIVE STATEMENT
Pt A&Ox4, presents to the ED c/o left shoulder bruising and mild pain. Pt was seen in ED earlier today and had her left shoulder reduced. Pt states "when I got home I noticed this bruising under my arm." Denies CP or SOB. Pt took Aleve around 5pm.

## 2022-06-17 NOTE — ED PROVIDER NOTE - CROS ED RESP ALL NEG
negative... Glycopyrrolate Counseling:  I discussed with the patient the risks of glycopyrrolate including but not limited to skin rash, drowsiness, dry mouth, difficulty urinating, and blurred vision.

## 2022-06-20 ENCOUNTER — NON-APPOINTMENT (OUTPATIENT)
Age: 67
End: 2022-06-20

## 2022-06-24 ENCOUNTER — APPOINTMENT (OUTPATIENT)
Dept: ORTHOPEDIC SURGERY | Facility: CLINIC | Age: 67
End: 2022-06-24
Payer: MEDICARE

## 2022-06-24 ENCOUNTER — NON-APPOINTMENT (OUTPATIENT)
Age: 67
End: 2022-06-24

## 2022-06-24 DIAGNOSIS — S49.92XA UNSPECIFIED INJURY OF LEFT SHOULDER AND UPPER ARM, INITIAL ENCOUNTER: ICD-10-CM

## 2022-06-24 PROCEDURE — 99203 OFFICE O/P NEW LOW 30 MIN: CPT

## 2022-06-24 NOTE — HISTORY OF PRESENT ILLNESS
[FreeTextEntry1] : The patient is a 66-year-old female who presents with her daughter today in the office for an evaluation of her left shoulder.  On 6/16/22 the patient had an accidental fall in her house after she tripped over a rug and hit her shoulder into a dresser, causing the full dislocation of the left shoulder.  She went to the emergency department that day where they performed x-rays which showed a full shoulder anterior dislocation, they relocated the shoulder in the ED that day.  She was discharged with a sling and presents with a sling on today.  She does have moderate bruising present in the left shoulder tracking down the upper arm.  The bruising is subsiding.  She does state that since the injury her pain is subsiding quite a bit.  She does have a very stiff shoulder as she has had her shoulder in the sling since the injury.  She cannot lay on the shoulder at night.  Her pain scale is controlled today 2 out of 10.  She has no other complaints.

## 2022-06-24 NOTE — PHYSICAL EXAM
[de-identified] : Laterality: Left shoulder\par \par General: Alert and oriented x3.  In no acute distress.  Pleasant in nature with a normal affect.  No apparent respiratory distress. \par Erythema, Warmth, Rubor: Negative\par Swelling: Negative\par \par ROM:\par FF: Limited forward flexion of the shoulder due to pain and stiffness.\par ER: 60 degrees\par Abduction: Limited abduction of the shoulder due to pain and stiffness.\par IR: Normal\par IR behind back: Left hip\par \par Special Test:\par I cannot perform any type of special test today in regards to strength of the shoulder.  The patient is extremely stiff from not moving her shoulder from the trauma/dislocation of the shoulder.  Her shoulder is extremely weak at this time and cannot test strength because of pain.\par \par Neurovascularly intact motor and sensory [de-identified] : ACC: [id number] EXAM: XR SHOULDER DC 1 VIEW LT\par \par PROCEDURE DATE: 06/16/2022\par \par \par \par INTERPRETATION: Left shoulder. 2 views.\par \par Prior imaging show dislocation.\par \par The dislocation has been reduced. No visible fracture.\par \par IMPRESSION: Reduction of dislocation.\par \par --- End of Report ---\par \par \par \par \par \par FIFI MCKEON MD; Attending Radiologist\par This document has been electronically signed. Jun 16 2022 2:53PM\par \par \par \par \par ACC: [id number] EXAM: XR SHOULDER COMP MIN 2V LT\par \par PROCEDURE DATE: 06/16/2022\par \par \par \par INTERPRETATION: 2 views of the left shoulder were performed for a history of left shoulder pain.\par \par Comparison is made to a previous study of 9-18.\par \par Although there is no evidence of fracture, there is anterior dislocation of the glenohumeral joint. The glenoid rim is grossly intact. The AC joint is unremarkable. There is a neutral acromion slope. There is no abnormal soft tissue calcification. No penetrating injury or foreign body is seen.\par \par IMPRESSION: Anterior dislocation of the left glenohumeral joint without evidence of fracture.\par \par --- End of Report ---\par \par \par \par \par \par DERECK MALDONADO MD; Attending Radiologist\par This document has been electronically signed. Jun 16 2022 2:53PM

## 2022-06-24 NOTE — DISCUSSION/SUMMARY
[de-identified] : At this time I like to go ahead and get an MRI without contrast of the left shoulder to evaluate the rotator cuff and labrum of the shoulder and to make sure there is no fracture seen from the trauma to the shoulder.  I did explain to the patient that I do not want her shoulder getting frozen and I want her doing Codman and pendulum exercises daily.  I do want the patient start outpatient physical therapy to start to regain some range of motion of the shoulder, as I do not want the shoulder to get frozen.  Once the MRI is completed I will review with the patient over the phone.  I do want her to follow-up with our sports medicine physician in 4 to 6 weeks to reevaluate her left shoulder.  In the interim she will continue with Tylenol for pain and Advil as needed and as tolerated.  She will continue with icing the shoulder.  All of her and her daughter's questions were answered.

## 2022-07-09 ENCOUNTER — NON-APPOINTMENT (OUTPATIENT)
Age: 67
End: 2022-07-09

## 2022-07-21 ENCOUNTER — APPOINTMENT (OUTPATIENT)
Dept: ORTHOPEDIC SURGERY | Facility: CLINIC | Age: 67
End: 2022-07-21

## 2022-07-21 PROCEDURE — 23600 CLTX PROX HUMRL FX W/O MNPJ: CPT

## 2022-07-21 PROCEDURE — 99214 OFFICE O/P EST MOD 30 MIN: CPT | Mod: 25

## 2022-07-21 NOTE — REVIEW OF SYSTEMS
[Joint Pain] : joint pain [Negative] : Heme/Lymph [Joint Stiffness] : joint stiffness [FreeTextEntry9] : Left (L) shoulder

## 2022-07-21 NOTE — DISCUSSION/SUMMARY
[de-identified] : ROBIN ALCAZAR is a 66 year y/o female who presents for initial visit of Left (L) shoulder pain. She reports on 6/16/22 she had an accidental fall in her house after she tripped over a rug and hit her shoulder into a dresser, causing full dislocation of the left shoulder. She was seen at the ED that day where she had x-rays done demonstrating a full shoulder anterior dislocation. The shoulder was relocated at the ED. She was seen by RADHA Moncada on 6/24 who provided a prescription for PT and ordered an MRI of the left shoulder. She reports compliance with PT (Progressive) and HEP. She presents for review of MRI that demonstrates nondisplaced nondepressed fracture of the greater tuberosity with marrow and soft tissue edema. \par \par Clinical exam and MRI findings demonstrate nondisplaced greater tuberosity fracture. We had a thorough discussion regarding the nature of her pain, the pathophysiology, as well as all treatment options. Conservative measures of treatment include rest until asymptomatic, activity avoidance, NSAID's PRN, application to ice to the area 2-3x daily for 20 minutes, with gradual return to activities. She will continue physical therapy as well as HEP for range of motion and strengthening.  Renewal of prescription for PT given today. All questions were answered and the patient verbalized understanding. Patient will follow up in 3-4 wks for repeat clinical assessment. The patient is in agreement with this treatment plan.

## 2022-07-21 NOTE — PHYSICAL EXAM
[de-identified] : Physical Exam:\par General: Well appearing, no acute distress\par Neurologic: A&Ox3, No focal deficits\par Head: NCAT without abrasions, lacerations, or ecchymosis to head, face, or scalp\par Eyes: No scleral icterus, no gross abnormalities\par Respiratory: Equal chest wall expansion bilaterally, no accessory muscle use\par Lymphatic: No lymphadenopathy palpated\par Skin: Warm and dry\par Psychiatric: Normal mood and affect\par \par Right Shoulder\par ·	Inspection/Palpation: no tenderness, swelling or deformities\par ·	Range of Motion: no crepitus with ROM; Active FF 0 - 150; ER at side 0 - 5\par ·	Strength: forward elevation in scapular plane 4/5, internal rotation 4/5, external rotation 4/5, adduction 4/5 and abduction 4/5\par ·	Stability: no joint instability on provocative testing\par ·	Tests: Guillen test positive, Neer positive, positive drop arm test secondary to pain, bear hug test positive, Napolean sign negative, cross arm adduction negative, lift off sign positive, hornblowers sign negative, speeds test negative, Yergason's test negative, no bicipital groove tenderness, Tyson's Active Compression test POS, whipple test POS, bicep's load II test negative\par \par Left Shoulder\par ·	Inspection/Palpation: no tenderness, swelling or deformities\par ·	Range of Motion: full and painless in all planes, no crepitus\par ·	Strength: forward elevation in scapular plane 5/5, internal rotation 5/5, external rotation 5/5, adduction 5/5 and abduction 4/5\par ·	Stability: no joint instability on provocative testing\par ·	Tests: Guillen test negative, Neer sign negative, negative drop arm test secondary to pain, bear hug test negative, Napolean sign negative, cross arm adduction negative, lift off sign positive, hornblowers sign negative, speeds test negative, Yergason's test negative, no bicipital groove tenderness, Tyson's Active Compression test negative  [de-identified] : Procedure: MRI of left shoulder\par Dated: 7/9/22\par \par Impression: \par Nondisplaced nondepressed fracture of the greater tuberosity with marrow and soft tissue edema. \par Rotator cuff tendinopathy and fraying with small 2 mm traction cyst a the humeral insertion.\par Fraying and tear of the superior labrum and anterior inferior labrum. \par Glenohumeral joint effusion. \par \par \par \par

## 2022-07-21 NOTE — HISTORY OF PRESENT ILLNESS
[de-identified] : ROBIN ALCAZAR is a 66 year y/o female who presents for initial visit of Left (L) shoulder pain. She reports on 6/16/22 she had an accidental fall in her house after she tripped over a rug and hit her shoulder into a dresser, causing full dislocation of the left shoulder. She was seen at the ED that day where she had x-rays done demonstrating a full shoulder anterior dislocation. The shoulder was relocated at the ED. She was seen by RADHA Moncada on 6/24 who provided a prescription for PT and ordered an MRI of the left shoulder. She reports compliance with PT (Progressive) and HEP. She presents for review of MRI that demonstrates nondisplaced nondepressed fracture of the greater tuberosity with marrow and soft tissue edema.

## 2022-08-16 ENCOUNTER — NON-APPOINTMENT (OUTPATIENT)
Age: 67
End: 2022-08-16

## 2022-08-16 ENCOUNTER — APPOINTMENT (OUTPATIENT)
Dept: ORTHOPEDIC SURGERY | Facility: CLINIC | Age: 67
End: 2022-08-16

## 2022-08-16 DIAGNOSIS — S43.005A UNSPECIFIED DISLOCATION OF LEFT SHOULDER JOINT, INITIAL ENCOUNTER: ICD-10-CM

## 2022-08-16 PROCEDURE — 73030 X-RAY EXAM OF SHOULDER: CPT | Mod: LT

## 2022-08-16 PROCEDURE — 99024 POSTOP FOLLOW-UP VISIT: CPT

## 2022-08-19 NOTE — DISCUSSION/SUMMARY
[de-identified] : I had a lengthy discussion with the patient regarding their current condition. We discussed the treatment options including operative and nonoperative management. At this time I recommended she continue with nonoperative management.  She should be optimized with her range of motion by physical therapy.  In 2 to 3 weeks she can initiate some gentle strengthening.  I discussed the potential for posttraumatic arthritis as well as loss of motion and stiffness due to this injury.  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  I stressed the importance of a home exercise program.  She will follow-up in 2 months for repeat evaluation clinically.  No x-rays necessary.  All questions were answered.

## 2022-08-19 NOTE — PHYSICAL EXAM
[de-identified] : Physical Exam: \par General: Well appearing, no acute distress \par Neurologic: A&Ox3, No focal deficits \par Head: NCAT without abrasions, lacerations, or ecchymosis to head, face, or scalp \par Eyes: No scleral icterus, no gross abnormalities \par Respiratory: Equal chest wall expansion bilaterally, no accessory muscle use \par Lymphatic: No lymphadenopathy palpated \par Skin: Warm and dry \par Psychiatric: Normal mood and affect\par \par Left shoulder is examined and reveals no atrophy or deformity.  There is no swelling or ecchymosis.  She has no specific bony tenderness in the shoulder.  She has active forward flexion to 130 degrees with some itching.  External rotation to 50 degrees and internal rotation to an upper lumbar level.  Her compartments are soft and nontender.  She has full elbow wrist and hand motion.  She has 2+ capillary refill and sensations intact distally [de-identified] : X-rays taken today in the office including multiple views of the left shoulder are reviewed.  These show a well-healing slightly impacted nondisplaced proximal humerus fracture to the greater tuberosity

## 2022-08-19 NOTE — HISTORY OF PRESENT ILLNESS
[Improving] : improving [___ mths] : [unfilled] month(s) ago [1] : an average pain level of 1/10 [0] : a minimum pain level of 0/10 [2] : a maximum pain level of 2/10 [Lifting] : worsened by lifting [de-identified] : ROBIN ALCAZAR is a 66 year female being seen for f/u visit L shoulder pain, secondary to shoulder dislocation and closed nondisplaced fracture of proximal end of left humerus. Patient has been going to PT 2x/week with significant improvement in ROM and strength. She reports her pain has localized to her shoulder, as it was radiating down to her hand prior to PT. No other complaints at this time, she is happy with her progress thus far.  She has a history of a left tibial plateau fracture which required a manipulation and lysis of adhesion by Dr. Bernal.

## 2022-10-11 ENCOUNTER — APPOINTMENT (OUTPATIENT)
Dept: ORTHOPEDIC SURGERY | Facility: CLINIC | Age: 67
End: 2022-10-11

## 2022-10-11 PROCEDURE — 99214 OFFICE O/P EST MOD 30 MIN: CPT | Mod: 24

## 2022-10-11 NOTE — DISCUSSION/SUMMARY
[de-identified] : Jackie presents for follow up 4 months s/p shoulder dislocation.  At this time I recommended she continue with nonoperative management.  She will continue physical therapy, now with an emphasis on strengthening.  She will follow-up in 3 months for repeat evaluation clinically.  No x-rays necessary.  All questions were answered.

## 2022-10-11 NOTE — HISTORY OF PRESENT ILLNESS
[de-identified] : ROBIN ALCAZAR is a 66 year female being seen for f/u visit L shoulder pain, secondary to shoulder dislocation and closed nondisplaced fracture of proximal end of left humerus. Patient has been going to PT 2x/week with significant improvement in ROM and strength. No other complaints at this time, she is happy with her progress thus far.

## 2022-10-11 NOTE — PHYSICAL EXAM
[de-identified] : Physical Exam: \par General: Well appearing, no acute distress \par Neurologic: A&Ox3, No focal deficits \par Head: NCAT without abrasions, lacerations, or ecchymosis to head, face, or scalp \par Eyes: No scleral icterus, no gross abnormalities \par Respiratory: Equal chest wall expansion bilaterally, no accessory muscle use \par Lymphatic: No lymphadenopathy palpated \par Skin: Warm and dry \par Psychiatric: Normal mood and affect\par \par Left shoulder is examined and reveals no atrophy or deformity. There is some stiffness.  She has active forward flexion to 130 degrees, passively to 150.  External rotation to 45 degrees and internal rotation to L1.  Adduction is 4/5 with some hitching of the shoulder. She has full elbow wrist and hand motion.  She has 2+ capillary refill and sensations intact distally.

## 2022-10-11 NOTE — ADDENDUM
[FreeTextEntry1] : Documented by Madison Barfield acting as a scribe for Dr. Richter on 10/11/2022.\par \par All medical record entries made by the Scribe were at my, Dr. Richter, direction and\par personally dictated by me on 10/11/2022. I have reviewed the chart and agree that the record\par accurately reflects my personal performance of the history, physical exam, procedure and imaging.

## 2022-12-14 ENCOUNTER — APPOINTMENT (OUTPATIENT)
Dept: DERMATOLOGY | Facility: CLINIC | Age: 67
End: 2022-12-14

## 2022-12-14 DIAGNOSIS — D18.00 HEMANGIOMA UNSPECIFIED SITE: ICD-10-CM

## 2022-12-14 DIAGNOSIS — H01.139 ECZEMATOUS DERMATITIS OF UNSPECIFIED EYE, UNSPECIFIED EYELID: ICD-10-CM

## 2022-12-14 PROCEDURE — 99213 OFFICE O/P EST LOW 20 MIN: CPT

## 2022-12-14 NOTE — PHYSICAL EXAM
[Alert] : alert [Oriented x 3] : ~L oriented x 3 [Well Nourished] : well nourished [Full Body Skin Exam Performed] : performed [FreeTextEntry3] : A full skin exam was performed including the scalp, face (including the lips, ears, nose and eyes), neck, chest, breasts, abdomen, back, buttocks, upper extremities and lower extremities.  The patient declined examination of the genitalia.  \par The exam revealed the following benign growths:\par Douglas pigmented nevi.\par Seborrheic keratoses.\par Lentigines.\par Cherry angioma.\par \par Mild erythema, bilateral lateral canthal regions.

## 2022-12-14 NOTE — ASSESSMENT
[FreeTextEntry1] : A complete skin examination was performed.  There is no evidence of skin cancer.  We discussed the importance of photoprotection, including the use of hats, protective clothing and sunscreens with an SPF of at least 30.  Sun avoidance was also discussed.  The ABCDE's of melanoma was discussed.  Regular skin exams recommended.\par \par Eyelid dermatitis\par Likely exacerbated from tears.\par Use vaseline qhs.

## 2022-12-14 NOTE — HISTORY OF PRESENT ILLNESS
[FreeTextEntry1] : Patient presents for skin examination. [de-identified] : Denies new, changing, bleeding or tender lesions on the skin over the past year.\par

## 2023-01-10 ENCOUNTER — APPOINTMENT (OUTPATIENT)
Dept: ORTHOPEDIC SURGERY | Facility: CLINIC | Age: 68
End: 2023-01-10
Payer: MEDICARE

## 2023-01-10 DIAGNOSIS — S42.295A OTHER NONDISPLACED FRACTURE OF UPPER END OF LEFT HUMERUS, INITIAL ENCOUNTER FOR CLOSED FRACTURE: ICD-10-CM

## 2023-01-10 PROCEDURE — 99214 OFFICE O/P EST MOD 30 MIN: CPT

## 2023-01-10 NOTE — ADDENDUM
[FreeTextEntry1] : Documented by Mitra Balderrama acting as a scribe for Dr. Richter and Eddie Schmid PA-C on 01/10/2023.   All medical record entries made by the Scribe were at my, Dr. Richter, and Eddie cShmid's, direction and personally dictated by me on 01/10/2023. I have reviewed the chart and agree that the record accurately reflects my personal performance of the history, physical exam, procedure and imaging.

## 2023-01-10 NOTE — PHYSICAL EXAM
[de-identified] : Physical Exam: \par General: Well appearing, no acute distress \par Neurologic: A&Ox3, No focal deficits \par Head: NCAT without abrasions, lacerations, or ecchymosis to head, face, or scalp \par Eyes: No scleral icterus, no gross abnormalities \par Respiratory: Equal chest wall expansion bilaterally, no accessory muscle use \par Lymphatic: No lymphadenopathy palpated \par Skin: Warm and dry \par Psychiatric: Normal mood and affect\par \par Left shoulder is examined and reveals no atrophy or deformity. There is some stiffness.  She has active forward flexion to 170 degrees.  External rotation to 55 degrees and internal rotation to L1.  Adduction is 4/5 with some hitching of the shoulder. She has full elbow wrist and hand motion.  She has 2+ capillary refill and sensations intact distally.

## 2023-01-10 NOTE — DISCUSSION/SUMMARY
IV removed and documented, report called to [facility], afternoon medications given, pt has all belongings and was cleaned up prior to d/c. Pt left floor via stretcher to return to [facility] via ambulance. [de-identified] : Patient is doing well at this time. I recommend her to continue her physical therapy exercises at home. She will follow up with me on an as needed basis. All questions were answered and the patient verbalized understanding. The patient is in agreement with this treatment plan.

## 2023-01-11 ENCOUNTER — NON-APPOINTMENT (OUTPATIENT)
Age: 68
End: 2023-01-11

## 2023-05-31 ENCOUNTER — NON-APPOINTMENT (OUTPATIENT)
Age: 68
End: 2023-05-31

## 2023-09-21 ENCOUNTER — NON-APPOINTMENT (OUTPATIENT)
Age: 68
End: 2023-09-21

## 2023-10-03 ENCOUNTER — NON-APPOINTMENT (OUTPATIENT)
Age: 68
End: 2023-10-03

## 2023-12-13 ENCOUNTER — APPOINTMENT (OUTPATIENT)
Dept: DERMATOLOGY | Facility: CLINIC | Age: 68
End: 2023-12-13
Payer: MEDICARE

## 2023-12-13 DIAGNOSIS — L81.4 OTHER MELANIN HYPERPIGMENTATION: ICD-10-CM

## 2023-12-13 DIAGNOSIS — D22.9 MELANOCYTIC NEVI, UNSPECIFIED: ICD-10-CM

## 2023-12-13 DIAGNOSIS — L82.1 OTHER SEBORRHEIC KERATOSIS: ICD-10-CM

## 2023-12-13 DIAGNOSIS — D18.01 HEMANGIOMA OF SKIN AND SUBCUTANEOUS TISSUE: ICD-10-CM

## 2023-12-13 PROCEDURE — 99213 OFFICE O/P EST LOW 20 MIN: CPT

## 2023-12-13 NOTE — HISTORY OF PRESENT ILLNESS
[FreeTextEntry1] : Patient presents for skin examination. [de-identified] : Denies new, changing, bleeding or tender lesions on the skin over the past year.

## 2023-12-13 NOTE — PHYSICAL EXAM
[Alert] : alert [Oriented x 3] : ~L oriented x 3 [Well Nourished] : well nourished [Full Body Skin Exam Performed] : performed [FreeTextEntry3] : A full skin exam was performed including the scalp, face, neck, chest, abdomen, back, buttocks, upper extremities and lower extremities.  The patient declined examination of the breasts and genitalia.   The exam did show the following benign growths: Oakley pigmented nevi. Seborrheic keratoses. Lentigines. Cherry angioma.

## 2024-03-03 NOTE — ED ADULT NURSE NOTE - NS PRO PASSIVE SMOKE EXP
Take medications as prescribed. Follow-up with your primary care provider for any persistent or worsening symptoms.   
No

## 2024-06-18 ENCOUNTER — NON-APPOINTMENT (OUTPATIENT)
Age: 69
End: 2024-06-18

## 2024-06-19 ENCOUNTER — RESULT REVIEW (OUTPATIENT)
Age: 69
End: 2024-06-19

## 2024-07-05 NOTE — ED ADULT TRIAGE NOTE - NSWEIGHTCALCTOOLDRUG_GEN_A_CORE
Problem: At Risk for Falls  Goal: Patient does not fall  Outcome: Monitoring/Evaluating progress  Goal: Patient takes action to control fall-related risks  Outcome: Monitoring/Evaluating progress     Problem: Delirium, Risk for  Goal: # No symptoms of delirium  Description: Evaluate delirium symptoms under active problem when present  Outcome: Monitoring/Evaluating progress  Goal: # Verbalizes understanding of delirium preventive strategies  Description: Document on Patient Education Activity   Outcome: Monitoring/Evaluating progress     Problem: Breathing Pattern Ineffective  Goal: Air exchange is effective, demonstrated by Sp02 sat of greater then or = 92% (or as ordered)  Outcome: Monitoring/Evaluating progress  Goal: Respiratory pattern is quiet and regular without report of SOB  Outcome: Monitoring/Evaluating progress       used

## 2024-11-18 NOTE — HISTORY OF PRESENT ILLNESS
[de-identified] : 1/10/2023: ROBIN is a 67 year female here today for f/u visit left shoulder pain. Patient is happy with her progress. She notes improvements in her ROM and strength. She recently just finished with PT. \par \par 10/11/2022: ROBIN ALCAZAR is a 66 year female being seen for f/u visit L shoulder pain, secondary to shoulder dislocation and closed nondisplaced fracture of proximal end of left humerus. Patient has been going to PT 2x/week with significant improvement in ROM and strength. No other complaints at this time, she is happy with her progress thus far.
good balance

## 2024-12-12 ENCOUNTER — NON-APPOINTMENT (OUTPATIENT)
Age: 69
End: 2024-12-12

## 2024-12-12 ENCOUNTER — APPOINTMENT (OUTPATIENT)
Dept: DERMATOLOGY | Facility: CLINIC | Age: 69
End: 2024-12-12
Payer: MEDICARE

## 2024-12-12 DIAGNOSIS — D22.9 MELANOCYTIC NEVI, UNSPECIFIED: ICD-10-CM

## 2024-12-12 DIAGNOSIS — L82.1 OTHER SEBORRHEIC KERATOSIS: ICD-10-CM

## 2024-12-12 DIAGNOSIS — L81.4 OTHER MELANIN HYPERPIGMENTATION: ICD-10-CM

## 2024-12-12 DIAGNOSIS — Z86.018 PERSONAL HISTORY OF OTHER BENIGN NEOPLASM: ICD-10-CM

## 2024-12-12 DIAGNOSIS — L24.9 IRRITANT CONTACT DERMATITIS, UNSPECIFIED CAUSE: ICD-10-CM

## 2024-12-12 DIAGNOSIS — L82.0 INFLAMED SEBORRHEIC KERATOSIS: ICD-10-CM

## 2024-12-12 DIAGNOSIS — D18.01 HEMANGIOMA OF SKIN AND SUBCUTANEOUS TISSUE: ICD-10-CM

## 2024-12-12 PROCEDURE — 99213 OFFICE O/P EST LOW 20 MIN: CPT

## 2025-01-11 ENCOUNTER — NON-APPOINTMENT (OUTPATIENT)
Age: 70
End: 2025-01-11

## 2025-01-16 ENCOUNTER — OUTPATIENT (OUTPATIENT)
Dept: OUTPATIENT SERVICES | Facility: HOSPITAL | Age: 70
LOS: 1 days | End: 2025-01-16
Payer: MEDICARE

## 2025-01-16 ENCOUNTER — APPOINTMENT (OUTPATIENT)
Dept: CT IMAGING | Facility: CLINIC | Age: 70
End: 2025-01-16
Payer: MEDICARE

## 2025-01-16 ENCOUNTER — TRANSCRIPTION ENCOUNTER (OUTPATIENT)
Age: 70
End: 2025-01-16

## 2025-01-16 ENCOUNTER — RESULT REVIEW (OUTPATIENT)
Age: 70
End: 2025-01-16

## 2025-01-16 ENCOUNTER — NON-APPOINTMENT (OUTPATIENT)
Age: 70
End: 2025-01-16

## 2025-01-16 ENCOUNTER — APPOINTMENT (OUTPATIENT)
Dept: OTOLARYNGOLOGY | Facility: CLINIC | Age: 70
End: 2025-01-16
Payer: MEDICARE

## 2025-01-16 VITALS — WEIGHT: 150 LBS | HEIGHT: 65 IN | BODY MASS INDEX: 24.99 KG/M2

## 2025-01-16 VITALS — SYSTOLIC BLOOD PRESSURE: 144 MMHG | HEART RATE: 96 BPM | DIASTOLIC BLOOD PRESSURE: 86 MMHG

## 2025-01-16 DIAGNOSIS — H90.A22 SENSORINEURAL HEARING LOSS, UNILATERAL, LEFT EAR, WITH RESTRICTED HEARING ON THE CONTRALATERAL SIDE: ICD-10-CM

## 2025-01-16 DIAGNOSIS — R25.2 CRAMP AND SPASM: ICD-10-CM

## 2025-01-16 DIAGNOSIS — H69.93 UNSPECIFIED EUSTACHIAN TUBE DISORDER, BILATERAL: ICD-10-CM

## 2025-01-16 PROCEDURE — 99203 OFFICE O/P NEW LOW 30 MIN: CPT

## 2025-01-16 PROCEDURE — 92557 COMPREHENSIVE HEARING TEST: CPT

## 2025-01-16 PROCEDURE — 70480 CT ORBIT/EAR/FOSSA W/O DYE: CPT | Mod: 26

## 2025-01-16 PROCEDURE — 70480 CT ORBIT/EAR/FOSSA W/O DYE: CPT

## 2025-01-16 PROCEDURE — 92567 TYMPANOMETRY: CPT

## 2025-01-16 RX ORDER — PNV NO.95/FERROUS FUM/FOLIC AC 28MG-0.8MG
TABLET ORAL
Refills: 0 | Status: ACTIVE | COMMUNITY

## 2025-01-16 RX ORDER — ASPIRIN 81 MG
81 TABLET,CHEWABLE ORAL
Refills: 0 | Status: ACTIVE | COMMUNITY

## 2025-01-20 ENCOUNTER — NON-APPOINTMENT (OUTPATIENT)
Age: 70
End: 2025-01-20

## 2025-03-26 ENCOUNTER — NON-APPOINTMENT (OUTPATIENT)
Age: 70
End: 2025-03-26

## 2025-03-27 ENCOUNTER — APPOINTMENT (OUTPATIENT)
Dept: DERMATOLOGY | Facility: CLINIC | Age: 70
End: 2025-03-27
Payer: MEDICARE

## 2025-03-27 DIAGNOSIS — R21 RASH AND OTHER NONSPECIFIC SKIN ERUPTION: ICD-10-CM

## 2025-03-27 PROCEDURE — 99214 OFFICE O/P EST MOD 30 MIN: CPT

## 2025-03-27 RX ORDER — HYDROCORTISONE 25 MG/G
2.5 OINTMENT TOPICAL
Qty: 1 | Refills: 0 | Status: ACTIVE | COMMUNITY
Start: 2025-03-27 | End: 1900-01-01

## 2025-03-27 RX ORDER — MUPIROCIN 20 MG/G
2 OINTMENT TOPICAL
Qty: 1 | Refills: 0 | Status: ACTIVE | COMMUNITY
Start: 2025-03-27 | End: 1900-01-01

## 2025-07-08 ENCOUNTER — APPOINTMENT (OUTPATIENT)
Dept: OTOLARYNGOLOGY | Facility: CLINIC | Age: 70
End: 2025-07-08
Payer: MEDICARE

## 2025-07-08 VITALS — HEIGHT: 65 IN | BODY MASS INDEX: 24.99 KG/M2 | WEIGHT: 150 LBS

## 2025-07-08 PROCEDURE — 92567 TYMPANOMETRY: CPT

## 2025-07-08 PROCEDURE — 99213 OFFICE O/P EST LOW 20 MIN: CPT

## 2025-07-08 PROCEDURE — 92557 COMPREHENSIVE HEARING TEST: CPT
